# Patient Record
Sex: MALE | Race: WHITE | NOT HISPANIC OR LATINO | Employment: FULL TIME | URBAN - METROPOLITAN AREA
[De-identification: names, ages, dates, MRNs, and addresses within clinical notes are randomized per-mention and may not be internally consistent; named-entity substitution may affect disease eponyms.]

---

## 2018-09-14 ENCOUNTER — TRANSCRIBE ORDERS (OUTPATIENT)
Dept: ADMINISTRATIVE | Facility: HOSPITAL | Age: 48
End: 2018-09-14

## 2018-09-14 DIAGNOSIS — G47.30 SLEEP APNEA, UNSPECIFIED TYPE: Primary | ICD-10-CM

## 2018-09-27 ENCOUNTER — OFFICE VISIT (OUTPATIENT)
Dept: SLEEP CENTER | Facility: CLINIC | Age: 48
End: 2018-09-27
Payer: COMMERCIAL

## 2018-09-27 VITALS
DIASTOLIC BLOOD PRESSURE: 64 MMHG | HEIGHT: 69 IN | HEART RATE: 68 BPM | WEIGHT: 158.2 LBS | BODY MASS INDEX: 23.43 KG/M2 | SYSTOLIC BLOOD PRESSURE: 110 MMHG

## 2018-09-27 DIAGNOSIS — G47.30 SLEEP APNEA, UNSPECIFIED TYPE: ICD-10-CM

## 2018-09-27 PROCEDURE — 99204 OFFICE O/P NEW MOD 45 MIN: CPT | Performed by: NURSE PRACTITIONER

## 2018-09-27 RX ORDER — EPINEPHRINE 0.3 MG/.3ML
INJECTION SUBCUTANEOUS
Refills: 0 | COMMUNITY
Start: 2018-08-16

## 2018-09-27 RX ORDER — FLUTICASONE PROPIONATE 50 MCG
2 SPRAY, SUSPENSION (ML) NASAL
COMMUNITY
Start: 2017-03-07 | End: 2020-07-13

## 2018-09-27 NOTE — PROGRESS NOTES
Consultation - [facility] [facility] 1970, MRN: [id number]    9/27/2018        Reason for Consult / Principal Problem:    Evaluation for possible sleep apnea     Subjective:     HPI: Lalita Camarena is a 52y o  year old male  He presents for evaluation of possible sleep apnea  He complains of waking up feeling like he is choking and gasping for air  He has a feeling that he stops breathing while he is sleeping  He sleeps alone  He states that he uses a pulse oximeter at home during sleep and has oxygen desaturations to 85%  He feels sleepy during the day, mostly after eating lunch  He denies any significant health problems  He has been treated for babesiosis in the past and feels that he may have some form of Lyme disease, but testing has been negative thus far  He spends time outside biking and backpacking  Review of Systems      Genitourinary none   Cardiology palpitations/fluttering feeling in the chest   Gastrointestinal abdominal pain or cramping that disturb sleep    Neurology awaken with headache, numbness/tingling of an extremity, forgetfulness, poor concentration or confusion,  and difficulty with memory   Constitutional none   Integumentary none   Psychiatry anxiety and aggressiveness or irritability   Musculoskeletal joint pain, back pain, legs twitching/jerking and sciatica   Pulmonary chest tightness   ENT throat clearing and ringing in ears   Endocrine frequent urination   Hematological none     Employment:  He currently works full-time as an , Monday through Friday with a flexible schedule    Sleep Schedule:       Bedtime:  1:00 a m        Latency:  30 min      Wakeup time:  7:45 a m  on work days and 9:00 a m  on weekends    Awakenings:       Frequency:  Average of 1 time per night, but this varies and can be several times per night      Causes:  Unknown causes      Duration:  He typically falls asleep easily within a few minutes    Daytime Sleepiness / Inappropriate Sleep:       Most severe:  After lunch       Naps :  He does not intentionally take naps       Inappropriate drowsiness / sleep:  Occasionally may fall asleep with sedentary activities such as watching television or reading  This occurs particularly if he has not had a good night of sleep the prior night  Snoring:  No known snoring    Apnea:  No reported witnessed apnea    Change in Weight:  Weight has been relatively stable    Restless Leg Syndrome:  He does not experience clinical symptoms consistent with this diagnosis     Other Complaints:  As a child he was told that he talked in his sleep occasionally  He denies any sleep walking  He occasionally has nightmares, which can be threatening  He denies any acting out of dreams  He does notice some brief jerking in sleep at sleep initiation  He notices an overall body tremor that worsens as he is trying to sleep, especially when he sleeps with a pillow  Symptoms have worsened over the past 6 months  He is taking herbal supplements as recommended by his chiropractor  He gets numbness, tingling and inability to move an extremity during sleep, which returns when he is able to reposition himself, due to pressure on nerves in his neck  He denies sleep paralysis or hallucinations surrounding sleep  He admits to slight weakness in his knees with laughter on occasion, without extreme symptoms of knees buckling or needing to sit down  He denies any other symptoms characteristic of cataplexy  Social History:      Caffeine:  He avoids the use of caffeine       Tobacco:   reports that he has never smoked  He has never used smokeless tobacco        Alcohol:   reports that he drinks alcohol  Occasional drink with dinner     Drugs:   reports that he does not use drugs         The review of systems and following portions of the patient's history were reviewed and updated as appropriate: allergies, current medications, past family history, past medical history, past social history, past surgical history and problem list       Objective:       Vitals:    09/27/18 1000   BP: 110/64   Pulse: 68   Weight: 71 8 kg (158 lb 3 2 oz)   Height: 5' 9" (1 753 m)     Body mass index is 23 36 kg/m²  Neck Circumference: 35 (cm)  [place] Sleepiness Scale: Total score: 7      Current Outpatient Prescriptions:     Cats Claw, Uncaria tomentosa, (CATS CLAW PO), Take by mouth, Disp: , Rfl:     EPINEPHrine (EPIPEN) 0 3 mg/0 3 mL SOAJ, INJECT INTO THIGH AT START OF SEVERE ALLERGIC REACTION, Disp: , Rfl: 0    MAGNESIUM PO, Take by mouth, Disp: , Rfl:     NON FORMULARY, , Disp: , Rfl:     OLIVE LEAF EXTRACT PO, Take by mouth, Disp: , Rfl:     fluticasone (FLONASE) 50 mcg/act nasal spray, 2 sprays, Disp: , Rfl:     Physical Exam  General Appearance:   Alert, cooperative, no distress, appears stated age     Head:   Normocephalic, without obvious abnormality, atraumatic     Eyes:   PERRL, conjunctiva/corneas clear, EOM's intact          Nose:  Nares normal, septum midline, mucosa normal, no drainage or sinus tenderness           Throat:  Lips, teeth and gums normal; tongue normal size and  shape and midline in position; mucosa moist and essentially normal, uvula normal, tonsils normal, Mallampati class 2       Neck:  Supple, symmetrical, trachea midline, no adenopathy; Thyroid: No enlargement, tenderness or nodules; no carotid bruit or JVD     Lungs:      Clear to auscultation bilaterally, respirations unlabored     Heart:   Regular rate and rhythm, S1 and S2 normal, no murmur, rub or gallop       Extremities:  Extremities normal, atraumatic, no cyanosis or edema     Pulses:  2+ and symmetric all extremities     Skin:  Skin color, texture, turgor normal, no rashes or lesions       Neurologic:  CNII-XII intact  Normal strength, sensation throughout     Sleep Study Results:  No prior study      ASSESSMENT / PLAN     1   Sleep apnea, unspecified type  Ambulatory referral to Sleep Medicine    Diagnostic Sleep Study    CPAP Study         Counseling / Coordination of Care  Total clinic time spent today 50 minutes  Greater than 50% of total time was spent with the patient and / or family counseling and / or coordination of care  A description of the counseling / coordination of care:     instructions for management, risk factor reductions, prognosis, patient and family education, impressions and risks and benefits of treatment options    Today we discussed the anatomy and physiology of the upper airway  I pointed out how changes in this region can result in both snoring and abnormal breathing events including apneas and hypopneas  I explained the most common co-morbidities of untreated sleep apnea  After this we talked about some forms of treatment including application of positive airway pressure, mandibular advancement devices and surgery  In order to evaluate the possibility of Obstructive Sleep Apnea as a cause of the patient's symptoms, a test will be completed to identify the presence or absence of abnormal nocturnal breathing  This will consist of either a diagnostic polysomnogram or a home sleep test   If significant abnormal nocturnal breathing is detected, nasal CPAP will be titrated to find the optimum pressure needed to maintain upper airway patency during sleep  Following testing, the patient will return to the Sleep [address] [place] [address] for a review of the test results and to discuss possible treatment options  The following instructions have been given to the patient today:    Patient Instructions   1  Schedule diagnostic sleep study with CPAP titration study to follow, if needed  2  Schedule follow up visit to review study results  3  Schedule DME appointment for CPAP equipment, if needed  4   Schedule follow up visit for CPAP compliance and recheck            Irais Holman, [facility] [facility]

## 2018-10-15 ENCOUNTER — TELEPHONE (OUTPATIENT)
Dept: SLEEP CENTER | Facility: CLINIC | Age: 48
End: 2018-10-15

## 2018-10-15 DIAGNOSIS — G47.30 SLEEP APNEA, UNSPECIFIED TYPE: Primary | ICD-10-CM

## 2018-10-15 NOTE — TELEPHONE ENCOUNTER
Per fax from pt's insurance ( [employer])  Pt's in-lab Diagnostic Study has been denied, pt did not meet criteria

## 2018-10-16 ENCOUNTER — TELEPHONE (OUTPATIENT)
Dept: SLEEP CENTER | Facility: CLINIC | Age: 48
End: 2018-10-16

## 2018-10-16 ENCOUNTER — TRANSCRIBE ORDERS (OUTPATIENT)
Dept: SLEEP CENTER | Facility: CLINIC | Age: 48
End: 2018-10-16

## 2018-11-28 ENCOUNTER — HOSPITAL ENCOUNTER (OUTPATIENT)
Dept: SLEEP CENTER | Facility: CLINIC | Age: 48
Discharge: HOME | End: 2018-11-28
Payer: COMMERCIAL

## 2018-11-28 DIAGNOSIS — G47.30 SLEEP APNEA, UNSPECIFIED TYPE: ICD-10-CM

## 2018-11-28 PROCEDURE — G0399 HOME SLEEP TEST/TYPE 3 PORTA: HCPCS | Performed by: PSYCHIATRY & NEUROLOGY

## 2018-11-28 PROCEDURE — G0399 HOME SLEEP TEST/TYPE 3 PORTA: HCPCS

## 2018-12-06 ENCOUNTER — TELEPHONE (OUTPATIENT)
Dept: SLEEP CENTER | Facility: CLINIC | Age: 48
End: 2018-12-06

## 2018-12-06 NOTE — TELEPHONE ENCOUNTER
----- Message from Dustin Lopez DO sent at 12/2/2018  4:34 PM EST -----  CPAP already scheduled  Schedule follow up with MARIO Lazcano

## 2018-12-06 NOTE — TELEPHONE ENCOUNTER
Spoke with patient regarding mild obstructive sleep apnea on study and CPAP  He will keep appointment for CPAP study on 12/27 as scheduled  He has a follow up with Lolly Ruiz on 1/8

## 2018-12-27 ENCOUNTER — HOSPITAL ENCOUNTER (OUTPATIENT)
Dept: SLEEP CENTER | Facility: CLINIC | Age: 48
Discharge: HOME | End: 2018-12-27
Payer: COMMERCIAL

## 2018-12-27 DIAGNOSIS — G47.30 SLEEP APNEA, UNSPECIFIED TYPE: ICD-10-CM

## 2018-12-27 PROCEDURE — 95811 POLYSOM 6/>YRS CPAP 4/> PARM: CPT | Performed by: PSYCHIATRY & NEUROLOGY

## 2018-12-27 PROCEDURE — 95811 POLYSOM 6/>YRS CPAP 4/> PARM: CPT

## 2018-12-28 ENCOUNTER — TELEPHONE (OUTPATIENT)
Dept: SLEEP CENTER | Facility: CLINIC | Age: 48
End: 2018-12-28

## 2018-12-28 NOTE — PROGRESS NOTES
Sleep Study Documentation    Pre-Sleep Study       Sleep testing procedure explained to patient:YES    Patient napped prior to study:NO    Caffeine:Dayshift worker after 12PM   Caffeine use:NO    Alcohol:Dayshift workers after 5PM: Alcohol use:NO    Typical day for patient:YES       Study Documentation  Treatment   Optimal PAP pressure:  6 cm H2O  Leak:Small  Snore: unknown  REM Obtained:no  Supplemental O2: no    Minimum SaO2 88 %  Baseline SaO2 96 8 %  PAP mask tried (list all) Respironics Comfort Gel Blue Medium Nasal mask  Respironics Esquivel FX Medium Nasal Pillows and heated humidity  ResMed Airfit N20 Medium Nasal mask  PAP mask choice (final) PT prefers ResMed N20 Med Nasal mask  PAP mask type:nasal  PAP pressure at which snoring was eliminated  unknown  Minimum SaO2 at final PAP pressure 93 %  Mode of Therapy:CPAP  ETCO2:No  CPAP changed to BiPAP:No    Mode of Therapy:CPAP    EKG abnormalities: no     EEG abnormalities: no    Study Terminated:no           Post-Sleep Study    Medication used at bedtime or during sleep study:NO    Patient reports time it took to fall asleep:greater than 60 minutes    Patient reports waking up during study:1 to 2 times  Patient reports returning to sleep in greater than 30 minutes  Patient reports sleeping less than 2 hours without dreaming  Patient reports sleep during study:worse than usual    Patient rated sleepiness: Not sleepy or tired    PAP treatment:yes: Post PAP treatment patient reports feeling unsure if a change is noted and  would wear PAP mask at home

## 2018-12-28 NOTE — TELEPHONE ENCOUNTER
Advised patient that sleep study is resulted and he has appt with Bailey Barillas on 1/8/2019 to discuss  treatment options

## 2018-12-28 NOTE — TELEPHONE ENCOUNTER
----- Message from Antwon Tran DO sent at 12/28/2018  8:12 AM EST -----  This may be a great deal of difficulty tolerating nasal CPAP  If he is willing to try CPAP patient be set up for a fitting  The order for this will be written depending on his decision

## 2019-02-26 ENCOUNTER — OFFICE VISIT (OUTPATIENT)
Dept: SLEEP CENTER | Facility: CLINIC | Age: 49
End: 2019-02-26
Payer: COMMERCIAL

## 2019-02-26 VITALS
HEART RATE: 63 BPM | BODY MASS INDEX: 24.2 KG/M2 | HEIGHT: 69 IN | SYSTOLIC BLOOD PRESSURE: 106 MMHG | WEIGHT: 163.4 LBS | DIASTOLIC BLOOD PRESSURE: 64 MMHG

## 2019-02-26 DIAGNOSIS — G47.33 OSA (OBSTRUCTIVE SLEEP APNEA): Primary | ICD-10-CM

## 2019-02-26 PROCEDURE — 99214 OFFICE O/P EST MOD 30 MIN: CPT | Performed by: NURSE PRACTITIONER

## 2019-02-26 NOTE — PATIENT INSTRUCTIONS
1   Schedule appointment with ENT, as planned  2   Schedule appointment for APAP set up with [facility]  3   Schedule appointment for compliance visit in 2-3 months  4   Consider mandibular advancement device  5   Consider OTC Theravent device - trial pack, especially for traveling  6    Consider Breathe Right Nasal Strips, especially for traveling

## 2019-02-26 NOTE — PROGRESS NOTES
Progress Note - [address] [facility] [address] 50 y o  male   :1970, MRN: [id number]  2019          Follow Up Evaluation / Problem:     Mild Obstructive Sleep Apnea    HPI: Vince Rojas is a 50y o  year old male  He presents for follow up of mild obstructive sleep apnea  He complained of nocturnal choking and gasping with oxygen desaturation at home, as well as some daytime sleepiness  He completed a home sleep study and was found to have mild obstructive sleep apnea with an SHADI of 6 9  He completed a CPAP titration study and is here to review the results of both studies and options for treatment  Review of Systems      Genitourinary none   Cardiology palpitations/fluttering feeling in the chest   Gastrointestinal frequent heartburn/acid reflux and abdominal pain or cramping that disturb sleep    Neurology numbness/tingling of an extremity, forgetfulness, poor concentration or confusion,  and difficulty with memory   Constitutional none   Integumentary rash or dry skin   Psychiatry none   Musculoskeletal back pain and sciatica   Pulmonary frequent cough   ENT throat clearing and ringing in ears   Endocrine none   Hematological none       Current Outpatient Medications:     Cats Claw, Uncaria tomentosa, (CATS CLAW PO), Take by mouth, Disp: , Rfl:     EPINEPHrine (EPIPEN) 0 3 mg/0 3 mL SOAJ, INJECT INTO THIGH AT START OF SEVERE ALLERGIC REACTION, Disp: , Rfl: 0    fluticasone (FLONASE) 50 mcg/act nasal spray, 2 sprays, Disp: , Rfl:     MAGNESIUM PO, Take by mouth, Disp: , Rfl:     NON FORMULARY, , Disp: , Rfl:     OLIVE LEAF EXTRACT PO, Take by mouth, Disp: , Rfl:     [place] Sleepiness Scale  Sitting and reading: Slight chance of dozing  Watching TV: Slight chance of dozing  Sitting, inactive in a public place (e g  a theatre or a meeting):  Would never doze  As a passenger in a car for an hour without a break: Slight chance of dozing  Lying down to rest in the afternoon when circumstances permit: Moderate chance of dozing  Sitting and talking to someone: Would never doze  Sitting quietly after a lunch without alcohol: Slight chance of dozing  In a car, while stopped for a few minutes in traffic: Would never doze  Total score: 6              Vitals:    02/26/19 0900   BP: 106/64   Pulse: 63   Weight: 74 1 kg (163 lb 6 4 oz)   Height: 5' 9" (1 753 m)       Body mass index is 24 13 kg/m²  EPWORTH SLEEPINESS SCORE  Total score: 6      Past History Since Last Sleep Center Visit:   He has had some recent worsening of acid reflux symptoms  He has used OTC TUMS to treat his symptoms  He reports that he has had a GI evaluation in the past and symptoms subsided  He has purchased a new adjustable bed that will be delivered tomorrow  He reports that he had an extremely difficult time wearing the CPAP masks  He felt like they were pulled too tightly on his head and face  This caused him to have difficulty falling asleep and woke him up when he did sleep  He did not know that he could make adjustments to the masks  He tried 3 different masks during the study, but was unable to return to sleep  Results of the CPAP titration study completed on 12/28/18 are as follows:  Sleep latency was 1 hr 15 min  REM sleep was not seen  Sleep efficiency was very low at 9 3%  No abnormal  breathing events were identified minimum oxygen saturation was 88% no periodic limb movements were seen  A  total of 14 arousals were identified  The arousal index is 24 3 events per hour  After a long sleep latency a brief  episode of sleep was identified  This occurred from approximately 12:30 a m  to 1:15 a m    The remainder of the  evening was spent in wakefulness nasal CPAP was attempted beginning with 5 cm and gradually increased to 6 cm  of water  The patient had great difficulty getting comfortable using nasal CPA P   He was particularly bothered by  various types of interface devices used during the study  The titration was terminated at 6 cm of water due to these  problems  Further discussion of treatment options including nasal CPAP, surgery (UPPP), mandibular advancement  devices and weight loss should be considered  The review of systems and following portions of the patient's history were reviewed and updated as appropriate: allergies, current medications, past family history, past medical history, past social history, past surgical history, and problem list         OBJECTIVE    PAP Pressure: Nasal AutoPAP using a lower limit of 5 cm and an upper limit of 20 cm of water pressure  DME Provider: [employer]    Physical Exam:     General Appearance:   Alert, cooperative, no distress, appears stated age     Head:   Normocephalic, without obvious abnormality, atraumatic     Eyes:   PERRL, conjunctiva/corneas clear, EOM's intact          Nose:  Nares normal, septum midline, no drainage or sinus tenderness           Throat:  Lips, teeth and gums normal; tongue normal size and  shape and midline mucosa moist and normal in appearance, uvula normal, tonsils normal, Mallampati class 2       Neck:  Supple, symmetrical, trachea midline, no adenopathy; Thyroid: No enlargement, tenderness or nodules; no carotid bruit or JVD     Lungs:      Clear to auscultation bilaterally, respirations unlabored     Heart:   Regular rate and rhythm, S1 and S2 normal, no murmur, rub or gallop       Extremities:  Extremities normal, atraumatic, no cyanosis or edema     Pulses:  2+ and symmetric all extremities     Skin:  Skin color, texture, turgor normal, no rashes or lesions       Neurologic:  No focal deficits noted  ASSESSMENT / PLAN    1  RED (obstructive sleep apnea)  Cpap New DME           Counseling / Coordination of Care  Total clinic time spent today 30 minutes  Greater than 50% of total time was spent with the patient and / or family counseling and / or coordination of care       A description of the counseling / coordination of care:     Impressions, Diagnostic results, Prognosis, Instructions for management, Risks and benefits of treatment, Patient and family education, Risk factor reductions and Importance of compliance with treatment    I reviewed the recent test results with the patient  We talked about the abnormal findings, including those consistent with Obstructive Sleep Apnea  We then discussed how the these are categorized as mild, moderate or severe  We also covered the medical comorbidities associated with untreated Obstructive Sleep Apnea including, hypertension, cardiac arrythmia, myocardial infarction and stroke  I explained how the risk of these conditions increases with the severity of the test results  I also spoke in some detail about the most common treatment options including nasal PAP, mandibular advancement devices and uvulopalatopharyngoplasty (UPPP)  I answered all questions posed to me and gave my opinion regarding the best options for treatment based on the patient and their level of disease  In this case he would like to have the opportunity to try APAP  Order has been placed for the equipment  He will schedule an appointment for set up  He plans to schedule an appointment for evaluation with ENT regarding nasal and sinus issues that he has had for many years  He has noticed some improvement in symptoms with the use of breathe right nasal strips in the past   We also discussed the Theravent OTC product to help with mild symptoms  The use of a wedge pillow or his new adjustable bed may also improve his symptoms  He does not wish to consider a mandibular advancement device at this time  The patient will return in 8 to 12 weeks for a review of compliance data collected since beginning treatment  We will discuss this data and talk about any problems that may prevent successful treatment of Obstructive Sleep Apnea    Changes will be made in treatment parameters or interface devices in order to improve his ability to continue using PAP to maintain upper airway patency during sleep  The following instructions have been given to the patient today:    Patient Instructions   1  Schedule appointment with ENT, as planned  2   Schedule appointment for APAP set up with [facility]  3   Schedule appointment for compliance visit in 2-3 months  4   Consider mandibular advancement device  5   Consider OTC Theravent device - trial pack, especially for traveling  6    Consider Breathe Right Nasal Strips, especially for traveling        Jeannette Olivares, [address] [facility]

## 2019-03-13 ENCOUNTER — TELEPHONE (OUTPATIENT)
Dept: SLEEP CENTER | Facility: CLINIC | Age: 49
End: 2019-03-13

## 2019-04-02 ENCOUNTER — TRANSCRIBE ORDERS (OUTPATIENT)
Dept: LAB | Facility: CLINIC | Age: 49
End: 2019-04-02

## 2019-04-02 ENCOUNTER — APPOINTMENT (OUTPATIENT)
Dept: LAB | Facility: CLINIC | Age: 49
End: 2019-04-02
Payer: COMMERCIAL

## 2019-04-02 DIAGNOSIS — R53.83 FATIGUE, UNSPECIFIED TYPE: ICD-10-CM

## 2019-04-02 DIAGNOSIS — R53.83 FATIGUE, UNSPECIFIED TYPE: Primary | ICD-10-CM

## 2019-04-02 PROCEDURE — 84260 ASSAY OF SEROTONIN: CPT

## 2019-04-02 PROCEDURE — 36415 COLL VENOUS BLD VENIPUNCTURE: CPT

## 2019-04-02 PROCEDURE — 86038 ANTINUCLEAR ANTIBODIES: CPT

## 2019-04-03 LAB — RYE IGE QN: NEGATIVE

## 2019-04-05 LAB — SEROTONIN PLAS-MCNC: 130 NG/ML (ref 21–321)

## 2019-04-09 ENCOUNTER — APPOINTMENT (OUTPATIENT)
Dept: LAB | Facility: CLINIC | Age: 49
End: 2019-04-09
Payer: COMMERCIAL

## 2019-04-09 DIAGNOSIS — R53.83 OTHER FATIGUE: Primary | ICD-10-CM

## 2019-04-09 PROCEDURE — 82570 ASSAY OF URINE CREATININE: CPT

## 2019-04-09 PROCEDURE — 36415 COLL VENOUS BLD VENIPUNCTURE: CPT

## 2019-04-09 PROCEDURE — 80171 DRUG SCREEN QUANT GABAPENTIN: CPT

## 2019-04-09 PROCEDURE — 82384 ASSAY THREE CATECHOLAMINES: CPT

## 2019-04-09 PROCEDURE — 82128 AMINO ACIDS MULT QUAL: CPT

## 2019-04-10 ENCOUNTER — TRANSCRIBE ORDERS (OUTPATIENT)
Dept: LAB | Facility: HOSPITAL | Age: 49
End: 2019-04-10

## 2019-04-10 DIAGNOSIS — R53.83 OTHER FATIGUE: Primary | ICD-10-CM

## 2019-04-11 LAB — GABAPENTIN SERPLBLD-MCNC: NORMAL UG/ML (ref 4–16)

## 2019-04-13 LAB
CREAT UR-MCNC: 128.5 MG/DL
DOPAMINE UR-MCNC: 176 UG/L
DOPAMINE/CREAT UR: 137 UG/G CREAT (ref 0–348)
EPINEPH UR-MCNC: 5 UG/L
EPINEPH/CREAT UR: 4 UG/G CREAT (ref 0–19)
NOREPINEPH UR-MCNC: 21 UG/L
NOREPINEPH/CREAT UR: 16 UG/G CREAT (ref 0–111)

## 2019-04-15 ENCOUNTER — APPOINTMENT (OUTPATIENT)
Dept: LAB | Facility: CLINIC | Age: 49
End: 2019-04-15
Payer: COMMERCIAL

## 2019-04-15 DIAGNOSIS — R53.83 OTHER FATIGUE: ICD-10-CM

## 2019-04-15 LAB
(HCYS)2/CREAT UR-RTO: <0.3 UMOL/G CR (ref 0–2)
A-AMINOBUTYR/CREAT UR-RTO: 6.8 UMOL/G CR (ref 0–34.6)
AAA/CREAT UR-RTO: 26.9 UMOL/G CR (ref 0–146.7)
ALANINE/CREAT UR-RTO: 175.6 UMOL/G CR (ref 0–1337)
ALLOISOLEUCINE/CREAT UR-RTO: 0.3 UMOL/G CR (ref 0–13.5)
AMINO ACID PAT SERPL-IMP: NORMAL
AMINO ACID, QN URINE: NORMAL
ARGININE/CREAT UR-RTO: 13.9 UMOL/G CR (ref 0–69.6)
ARGININOSUCCINATE/CREAT UR-RTO: 10.1 UMOL/G CR (ref 0–51.2)
ASPARAGINE/CREAT UR-RTO: 66.9 UMOL/G CR (ref 0–454.2)
ASPARTATE/CREAT UR-RTO: 2.2 UMOL/G CR (ref 0–86.7)
B-AIB/CREAT UR-RTO: 231.7 UMOL/G CR (ref 0–807.9)
B-ALANINE/CREAT UR-RTO: 6.5 UMOL/G CR (ref 0–869.8)
CITRULLINE/CREAT UR-RTO: 2.3 UMOL/G CR (ref 0–27.4)
CYSTATHIONIN/CREAT UR-RTO: 9.3 UMOL/G CR (ref 0–80.8)
CYSTINE/CREAT UR-RTO: 26.8 UMOL/G CR (ref 0–223.8)
GABA/CREAT UR-RTO: 0.7 UMOL/G CR (ref 0–13.1)
GLUTAMATE/CREAT UR-RTO: 8.4 UMOL/G CR (ref 0–92.4)
GLUTAMINE/CREAT UR-RTO: 297.7 UMOL/G CR (ref 0–1756.2)
GLYCINE/CREAT UR-RTO: 625.5 UMOL/G CR (ref 0–7996.9)
HISTIDINE/CREAT UR-RTO: 362.5 UMOL/G CR (ref 0–2534.2)
HOMOCITRULLINE/CREAT UR-RTO: 9.2 UMOL/G CR (ref 0–80)
ISOLEUCINE/CREAT UR-RTO: 6.2 UMOL/G CR (ref 0–48.1)
LAB DIRECTOR NAME PROVIDER: NORMAL
LEUCINE/CREAT UR-RTO: 15.5 UMOL/G CR (ref 0–129.1)
LYSINE/CREAT UR-RTO: 48.8 UMOL/G CR (ref 0–1020.6)
METHIONINE/CREAT UR-RTO: 4.3 UMOL/G CR (ref 0–37.1)
OH-LYSINE/CREAT UR-RTO: 2.4 UMOL/G CR (ref 0–37.3)
OH-PROLINE/CREAT UR-RTO: 1.2 UMOL/G CR (ref 0–87.9)
ORNITHINE/CREAT UR-RTO: 6.7 UMOL/G CR (ref 0–76.3)
PHE/CREAT UR-RTO: 19 UMOL/G CR (ref 0–239)
PROLINE/CREAT UR-RTO: <5 UMOL/G CR (ref 0–168.6)
REF LAB TEST METHOD: NORMAL
SARCOSINE/CREAT UR-RTO: 1.1 UMOL/G CR (ref 0–27.3)
SERINE/CREAT UR-RTO: 209.4 UMOL/G CR (ref 0–1052.8)
TAURINE/CREAT UR-RTO: 459.8 UMOL/G CR (ref 0–5335.7)
THREONINE/CREAT UR-RTO: 90.2 UMOL/G CR (ref 0–714.9)
TRYPTOPHAN/CREAT UR-RTO: 27.2 UMOL/G CR (ref 0–207.5)
TYROSINE/CREAT UR-RTO: 32.5 UMOL/G CR (ref 0–388.9)
VALINE/CREAT UR-RTO: 24.2 UMOL/G CR (ref 0–147.4)

## 2019-04-15 PROCEDURE — 36415 COLL VENOUS BLD VENIPUNCTURE: CPT

## 2019-04-15 PROCEDURE — 83088 ASSAY OF HISTAMINE: CPT

## 2019-04-18 LAB — HISTAMINE BLD-MCNC: 46 NG/ML (ref 12–127)

## 2019-05-03 ENCOUNTER — APPOINTMENT (OUTPATIENT)
Dept: LAB | Facility: CLINIC | Age: 49
End: 2019-05-03
Payer: COMMERCIAL

## 2019-05-03 DIAGNOSIS — R53.83 OTHER FATIGUE: Primary | ICD-10-CM

## 2019-05-03 LAB — IRON SERPL-MCNC: 174 UG/DL (ref 65–175)

## 2019-05-03 PROCEDURE — 80171 DRUG SCREEN QUANT GABAPENTIN: CPT

## 2019-05-03 PROCEDURE — 80307 DRUG TEST PRSMV CHEM ANLYZR: CPT

## 2019-05-03 PROCEDURE — 83540 ASSAY OF IRON: CPT

## 2019-05-03 PROCEDURE — 36415 COLL VENOUS BLD VENIPUNCTURE: CPT

## 2019-05-06 LAB — GABAPENTIN SERPLBLD-MCNC: NORMAL UG/ML (ref 4–16)

## 2019-05-07 LAB — MISCELLANEOUS LAB TEST RESULT: NORMAL

## 2019-05-29 ENCOUNTER — TELEPHONE (OUTPATIENT)
Dept: SLEEP CENTER | Facility: CLINIC | Age: 49
End: 2019-05-29

## 2019-05-29 ENCOUNTER — OFFICE VISIT (OUTPATIENT)
Dept: SLEEP CENTER | Facility: CLINIC | Age: 49
End: 2019-05-29
Payer: COMMERCIAL

## 2019-05-29 VITALS
WEIGHT: 155 LBS | DIASTOLIC BLOOD PRESSURE: 66 MMHG | HEIGHT: 69 IN | HEART RATE: 70 BPM | SYSTOLIC BLOOD PRESSURE: 120 MMHG | BODY MASS INDEX: 22.96 KG/M2

## 2019-05-29 DIAGNOSIS — G47.31 COMPLEX SLEEP APNEA SYNDROME: Primary | ICD-10-CM

## 2019-05-29 PROCEDURE — 99214 OFFICE O/P EST MOD 30 MIN: CPT | Performed by: NURSE PRACTITIONER

## 2019-05-29 RX ORDER — ZOLPIDEM TARTRATE 10 MG/1
TABLET ORAL
Qty: 1 TABLET | Refills: 0 | Status: CANCELLED | OUTPATIENT
Start: 2019-05-29

## 2019-05-31 ENCOUNTER — TELEPHONE (OUTPATIENT)
Dept: SLEEP CENTER | Facility: CLINIC | Age: 49
End: 2019-05-31

## 2019-06-17 ENCOUNTER — TELEPHONE (OUTPATIENT)
Dept: PULMONOLOGY | Facility: HOSPITAL | Age: 49
End: 2019-06-17

## 2019-09-16 ENCOUNTER — OFFICE VISIT (OUTPATIENT)
Dept: PULMONOLOGY | Facility: HOSPITAL | Age: 49
End: 2019-09-16
Payer: COMMERCIAL

## 2019-09-16 VITALS
HEART RATE: 70 BPM | DIASTOLIC BLOOD PRESSURE: 70 MMHG | SYSTOLIC BLOOD PRESSURE: 110 MMHG | OXYGEN SATURATION: 96 % | BODY MASS INDEX: 23.33 KG/M2 | WEIGHT: 158 LBS | TEMPERATURE: 98.5 F

## 2019-09-16 DIAGNOSIS — R06.89 HYPOVENTILATION: ICD-10-CM

## 2019-09-16 DIAGNOSIS — G47.30 SLEEP APNEA, UNSPECIFIED TYPE: Primary | ICD-10-CM

## 2019-09-16 DIAGNOSIS — R06.00 DYSPNEA, UNSPECIFIED TYPE: ICD-10-CM

## 2019-09-16 PROCEDURE — 99204 OFFICE O/P NEW MOD 45 MIN: CPT | Performed by: INTERNAL MEDICINE

## 2019-09-16 RX ORDER — MELATONIN
50000 DAILY
COMMUNITY

## 2019-09-16 RX ORDER — POTASSIUM CHLORIDE 1.5 G/1.77G
500 POWDER, FOR SOLUTION ORAL 2 TIMES DAILY
COMMUNITY

## 2019-09-16 RX ORDER — LANOLIN ALCOHOL/MO/W.PET/CERES
CREAM (GRAM) TOPICAL DAILY
COMMUNITY

## 2019-09-16 NOTE — LETTER
September 16, 2019     AMRIO Solo  [address]    Patient: Juliocesar Nascimento   YOB: 1970   Date of Visit: 9/16/2019       Dear Dr Aracelis Agrawal Recipients: Thank you for referring Juliocesar Nascimento to me for evaluation  Below are my notes for this consultation  If you have questions, please do not hesitate to call me  I look forward to following your patient along with you  Sincerely,        Emili Burton DO        CC: No Recipients  Emili Butron DO  9/16/2019 10:42 PM  Sign at close encounter  Sleep Consultation   Juliocesar Nascimento 50 y o  male MRN: [id number]      Reason for consultation: management of RED/Complex apnea    Requesting physician: Miranda MARTIN    Assessment/Plan  50 y o  M with PMHx of C3-C6 foraminal stenosis who comes in for management of RED and nocturnal hypoxia  1   Mild RED (AHI - 6 9) - on AutoPAP 4-9  No clinical improvement and residual AHI - 9 5 which appears to be more obstructive but there are central events  (DME - brigette)       Mr Kindra Arevalo has failed CPAP  He has not been able to tolerate CPAP at higher pressures and he is have more events and hypoxia despite CPAP use  -  Will order a BiPAP study  This will help with tolerance and may also address #2  If he continues to have central events, will need to switch to ASV  -  Check ABG to assess for hypercapnia       -  If he has difficulty tolerating the above, can consider a positional device such as ZZOMA  However, I think in this case he would benefit from a repeat home study where he would try to stay off his back instead of purposely trying to sleep on his back  -  I also discussed in depth the risk of leaving sleep apnea untreated including hypertension, heart failure, arrhythmia, MI and stroke  2   Nocturnal hypoxia - this is noted as per pulse oximeter that he has that vibrates as he desaturates below 88%    He did not have this occur during his sleep study  If he truly does have nocturnal desaturation this could be from central apnea or alveolar hypoventilation (CO2 on BMP 27 - 31 in previous 3 years)  I do wonder if some of this is related to some diaphragmatic dysfunction related to cervical spine stenosis  However, he also did frequent scuba diving which could have also altered his pattern of respiratory as well  -  Check PFTs to assess for restrictive lung disease      -  Check ABG to assess for chronic hypercapnia      -  Consider CXR, SNF test to assess diaphragm function      -  BiPAP study as above      History of Present Illness   HPI:  Danna Guerrero is a 50 y o  male with PMHx as below who comes in for evaluation of obstructive sleep apnea and awakening with gasping  He was [facility] at [facility] where he was treated for mild RED with autoPAP  He had some difficulty with tolerance and recently had the pressure decreased  He states that he has never felt a clinical benefit with CPAP to this point  He has grown very frustrated with the machine and does not want to use it any longer  He has actually tried using an overnight pulse ox to see if he desaturates at night  It will actually wake him up if saturation drops below 88%  He has noticed that this occurs particularly when he is on his back  He will just start to drift asleep and then he will feel the vibration of the machine  He has even tried using the oximeter on BiPAP and it does not seem to reduce his hypoxia either  He has tried several masks to see which works best for him but has not noted significant improvement in his symptoms with any mask  He went to see his sleep physician and was found to have central events and was sent to be evaluated by me today to see if there are some adjustments that could occur  He feels very sleepy and frustrated with the machine    He states that he is willing to try anything to help with the apnea  Sleep History:  he goes to bed at approximately 12 - 1am, will get to sleep in 30 min, will get out of bed at 7:45 am   he will get up 1 times at night for unknown reason  It will then take few minutes to fall back asleep    he does not nap during he day  He also mentioned that during the day he will note a few periods where he can not take a deep breath  He feels like his body will actually cause him to pause his breathing  ROS:   Review of Systems   Constitutional: Positive for fatigue  Negative for appetite change and unexpected weight change  HENT: Negative  Eyes: Negative  Respiratory: Positive for apnea and choking  Negative for shortness of breath and wheezing  Cardiovascular: Negative  Gastrointestinal: Negative  Endocrine: Negative  Genitourinary: Negative  Musculoskeletal: Positive for neck pain  Negative for joint swelling  Neurological: Positive for numbness  Negative for dizziness  Hematological: Negative  Psychiatric/Behavioral: Positive for behavioral problems and sleep disturbance  Negative for dysphoric mood  The patient is not hyperactive  Historical Information   No past medical history on file    Past Surgical History:   Procedure Laterality Date    ARTHROSCOPY KNEE Left 1989    MOLE REMOVAL      TUMOR REMOVAL      benign turmor removed from forehead    WISDOM TOOTH EXTRACTION       Family History   Problem Relation Age of Onset    Hypertension Father     Stomach cancer Maternal Grandfather      Social History     Socioeconomic History    Marital status: Single     Spouse name: Not on file    Number of children: Not on file    Years of education: Not on file    Highest education level: Not on file   Occupational History    Not on file   Social Needs    Financial resource strain: Not on file    Food insecurity:     Worry: Not on file     Inability: Not on file    Transportation needs:     Medical: Not on file Non-medical: Not on file   Tobacco Use    Smoking status: Never Smoker    Smokeless tobacco: Never Used   Substance and Sexual Activity    Alcohol use: Yes     Comment: occasional drink with dinner    Drug use: No    Sexual activity: Not on file   Lifestyle    Physical activity:     Days per week: Not on file     Minutes per session: Not on file    Stress: Not on file   Relationships    Social connections:     Talks on phone: Not on file     Gets together: Not on file     Attends Shinto service: Not on file     Active member of club or organization: Not on file     Attends meetings of clubs or organizations: Not on file     Relationship status: Not on file    Intimate partner violence:     Fear of current or ex partner: Not on file     Emotionally abused: Not on file     Physically abused: Not on file     Forced sexual activity: Not on file   Other Topics Concern    Not on file   Social History Narrative    Not on file       Occupational History:   Frequently goes scuba diving    Meds/Allergies   Allergies   Allergen Reactions    Other      Cats, dust    Penicillins     Pollen Extract      pollen    Azithromycin Rash       Home medications:  Prior to Admission medications    Medication Sig Start Date End Date Taking?  Authorizing Provider   cholecalciferol (VITAMIN D3) 1,000 units tablet Take 50,000 Units by mouth daily   Yes Historical Provider, MD   EPINEPHrine (EPIPEN) 0 3 mg/0 3 mL SOAJ INJECT INTO THIGH AT START OF SEVERE ALLERGIC REACTION 8/16/18  Yes Historical Provider, MD   MAGNESIUM PO Take by mouth   Yes Historical Provider, MD   NON FORMULARY    Yes Historical Provider, MD   OLIVE LEAF EXTRACT PO Take by mouth   Yes Historical Provider, MD   potassium chloride (KLOR-CON) 20 mEq packet Take 500 mEq by mouth 2 (two) times a day   Yes Historical Provider, MD   vitamin B-12 (VITAMIN B-12) 1,000 mcg tablet Take by mouth daily   Yes Historical Provider, MD Suzie Dove tomentosa, (CATS CLAW PO) Take by mouth    Historical Provider, MD   fluticasone (FLONASE) 50 mcg/act nasal spray 2 sprays 3/7/17   Historical Provider, MD       Vitals:   Blood pressure 110/70, pulse 70, temperature 98 5 °F (36 9 °C), temperature source Tympanic, weight 71 7 kg (158 lb), SpO2 96 % , RA, Body mass index is 23 33 kg/m²  Physical Exam  General: Pleasant, thin, Awake alert and oriented x 3, conversant without conversational dyspnea, NAD, normal affect  HEENT:  PERRL, Sclera noninjected, nonicteric OU, Nares patent,  no craniofacial abnormalities, Mucous membranes, moist, no oral lesions, normal dentition, Mallampati class 2  NECK: Trachea midline, no accessory muscle use, no stridor, no cervical or supraclavicular adenopathy, JVP not elevated  CARDIAC: Reg, single s1/S2, no m/r/g  PULM: CTA bilaterally no wheezing, rhonchi or rales  ABD: Normoactive bowel sounds, soft nontender, nondistended, no rebound, no rigidity, no guarding  EXT: No cyanosis, no clubbing, no edema, normal capillary refill  NEURO: no focal neurologic deficits, AAOx3, moving all extremities appropriately    Labs: I have personally reviewed pertinent lab results  Lab Results   Component Value Date    IRON 174 05/03/2019       Sleep studies:  Home study:  he apnea-hypopnea index was 6 9 events per hour  Lowest measured oxygen saturation was 80%  No other significant abnormalities were identified  These findings are  consistent with a diagnosis of mild obstructive sleep apnea  A repeat tracing for titration of nasal CPAP is indicated  Titration:  Sleep latency was 1 hr 15 min  REM sleep was not seen  Sleep efficiency was very low at 9 3%  No abnormal  breathing events were identified minimum oxygen saturation was 88% no periodic limb movements were seen  A  total of 14 arousals were identified       The remainder of the  evening was spent in wakefulness nasal CPAP was attempted beginning with 5 cm and gradually increased to 6 cm of water  The patient had great difficulty getting comfortable using nasal CPA P  He was particularly bothered by various types of interface devices used during the study  The titration was terminated at 6 cm of water  Compliance Data:   he has been able to use the equipment 73 3% of all days recorded  Average usage was 4 or more hours 66 7% of all days recorded  The estimated AHI is 7 1 abnormal breathing events per hour  In looking at data from the [facility], types of apneas are mixed between clear, hypopneas and obstructive, including almost equal amounts of hypopneas and clear apneas, with less obstructive apnea        Compliance Data:  8/17/19 - 9/15/19                                  Type of CPAP:  autoPAP 4-9, mean pressure 6 7-8 3                                   Percent usage: 87%                                   Average time used: 4 hr 4 mins                                  Time in large leak: 11 mins 21 sec                                   Residual AHI: 9 5,  Average ALOK - 3 1  OAI - 2 3                                       DO [facility]

## 2019-09-17 NOTE — PROGRESS NOTES
Sleep Consultation   John Lucero 50 y o  male MRN: [id number]      Reason for consultation: management of RED/Complex apnea    Requesting physician: Ruddy MARTIN    Assessment/Plan  50 y o  M with PMHx of C3-C6 foraminal stenosis who comes in for management of RED and nocturnal hypoxia  1   Mild RED (AHI - 6 9) - on AutoPAP 4-9  No clinical improvement and residual AHI - 9 5 which appears to be more obstructive but there are central events  (DME - brigette)       Mr Song Pinedo has failed CPAP  He has not been able to tolerate CPAP at higher pressures and he is have more events and hypoxia despite CPAP use  -  Will order a BiPAP study  This will help with tolerance and may also address #2  If he continues to have central events, will need to switch to ASV  -  Check ABG to assess for hypercapnia       -  If he has difficulty tolerating the above, can consider a positional device such as ZZOMA  However, I think in this case he would benefit from a repeat home study where he would try to stay off his back instead of purposely trying to sleep on his back  -  I also discussed in depth the risk of leaving sleep apnea untreated including hypertension, heart failure, arrhythmia, MI and stroke  2   Nocturnal hypoxia - this is noted as per pulse oximeter that he has that vibrates as he desaturates below 88%  He did not have this occur during his sleep study  If he truly does have nocturnal desaturation this could be from central apnea or alveolar hypoventilation (CO2 on BMP 27 - 31 in previous 3 years)  I do wonder if some of this is related to some diaphragmatic dysfunction related to cervical spine stenosis  However, he also did frequent scuba diving which could have also altered his pattern of respiratory as well        -  Check PFTs to assess for restrictive lung disease      -  Check ABG to assess for chronic hypercapnia      -  Consider CXR, SNF test to assess diaphragm function      -  BiPAP study as above      History of Present Illness   HPI:  Taty Newby is a 50 y o  male with PMHx as below who comes in for evaluation of obstructive sleep apnea and awakening with gasping  He was seen at the [facility] where he was treated for mild RED with autoPAP  He had some difficulty with tolerance and recently had the pressure decreased  He states that he has never felt a clinical benefit with CPAP to this point  He has grown very frustrated with the machine and does not want to use it any longer  He has actually tried using an overnight pulse ox to see if he desaturates at night  It will actually wake him up if saturation drops below 88%  He has noticed that this occurs particularly when he is on his back  He will just start to drift asleep and then he will feel the vibration of the machine  He has even tried using the oximeter on BiPAP and it does not seem to reduce his hypoxia either  He has tried several masks to see which works best for him but has not noted significant improvement in his symptoms with any mask  He went to see his sleep physician and was found to have central events and was sent to be evaluated by me today to see if there are some adjustments that could occur  He feels very sleepy and frustrated with the machine  He states that he is willing to try anything to help with the apnea  Sleep History:  he goes to bed at approximately 12 - 1am, will get to sleep in 30 min, will get out of bed at 7:45 am   he will get up 1 times at night for unknown reason  It will then take few minutes to fall back asleep    he does not nap during he day  He also mentioned that during the day he will note a few periods where he can not take a deep breath  He feels like his body will actually cause him to pause his breathing  ROS:   Review of Systems   Constitutional: Positive for fatigue   Negative for appetite change and unexpected weight change  HENT: Negative  Eyes: Negative  Respiratory: Positive for apnea and choking  Negative for shortness of breath and wheezing  Cardiovascular: Negative  Gastrointestinal: Negative  Endocrine: Negative  Genitourinary: Negative  Musculoskeletal: Positive for neck pain  Negative for joint swelling  Neurological: Positive for numbness  Negative for dizziness  Hematological: Negative  Psychiatric/Behavioral: Positive for behavioral problems and sleep disturbance  Negative for dysphoric mood  The patient is not hyperactive  Historical Information   No past medical history on file    Past Surgical History:   Procedure Laterality Date    ARTHROSCOPY KNEE Left 1989    MOLE REMOVAL      TUMOR REMOVAL      benign turmor removed from forehead    WISDOM TOOTH EXTRACTION       Family History   Problem Relation Age of Onset    Hypertension Father     Stomach cancer Maternal Grandfather      Social History     Socioeconomic History    Marital status: Single     Spouse name: Not on file    Number of children: Not on file    Years of education: Not on file    Highest education level: Not on file   Occupational History    Not on file   Social Needs    Financial resource strain: Not on file    Food insecurity:     Worry: Not on file     Inability: Not on file    Transportation needs:     Medical: Not on file     Non-medical: Not on file   Tobacco Use    Smoking status: Never Smoker    Smokeless tobacco: Never Used   Substance and Sexual Activity    Alcohol use: Yes     Comment: occasional drink with dinner    Drug use: No    Sexual activity: Not on file   Lifestyle    Physical activity:     Days per week: Not on file     Minutes per session: Not on file    Stress: Not on file   Relationships    Social connections:     Talks on phone: Not on file     Gets together: Not on file     Attends Rastafarian service: Not on file     Active member of club or organization: Not on file     Attends meetings of clubs or organizations: Not on file     Relationship status: Not on file    Intimate partner violence:     Fear of current or ex partner: Not on file     Emotionally abused: Not on file     Physically abused: Not on file     Forced sexual activity: Not on file   Other Topics Concern    Not on file   Social History Narrative    Not on file       Occupational History:   Frequently goes scuba diving    Meds/Allergies   Allergies   Allergen Reactions    Other      Cats, dust    Penicillins     Pollen Extract      pollen    Azithromycin Rash       Home medications:  Prior to Admission medications    Medication Sig Start Date End Date Taking? Authorizing Provider   cholecalciferol (VITAMIN D3) 1,000 units tablet Take 50,000 Units by mouth daily   Yes Historical Provider, MD   EPINEPHrine (EPIPEN) 0 3 mg/0 3 mL SOAJ INJECT INTO THIGH AT START OF SEVERE ALLERGIC REACTION 8/16/18  Yes Historical Provider, MD   MAGNESIUM PO Take by mouth   Yes Historical Provider, MD   NON FORMULARY    Yes Historical Provider, MD   OLIVE LEAF EXTRACT PO Take by mouth   Yes Historical Provider, MD   potassium chloride (KLOR-CON) 20 mEq packet Take 500 mEq by mouth 2 (two) times a day   Yes Historical Provider, MD   vitamin B-12 (VITAMIN B-12) 1,000 mcg tablet Take by mouth daily   Yes Historical Provider, MD   Cats Claw, Uncaria tomentosa, (CATS CLAW PO) Take by mouth    Historical Provider, MD   fluticasone (FLONASE) 50 mcg/act nasal spray 2 sprays 3/7/17   Historical Provider, MD       Vitals:   Blood pressure 110/70, pulse 70, temperature 98 5 °F (36 9 °C), temperature source Tympanic, weight 71 7 kg (158 lb), SpO2 96 % , RA, Body mass index is 23 33 kg/m²         Physical Exam  General: Pleasant, thin, Awake alert and oriented x 3, conversant without conversational dyspnea, NAD, normal affect  HEENT:  PERRL, Sclera noninjected, nonicteric OU, Nares patent,  no craniofacial abnormalities, Mucous membranes, moist, no oral lesions, normal dentition, Mallampati class 2  NECK: Trachea midline, no accessory muscle use, no stridor, no cervical or supraclavicular adenopathy, JVP not elevated  CARDIAC: Reg, single s1/S2, no m/r/g  PULM: CTA bilaterally no wheezing, rhonchi or rales  ABD: Normoactive bowel sounds, soft nontender, nondistended, no rebound, no rigidity, no guarding  EXT: No cyanosis, no clubbing, no edema, normal capillary refill  NEURO: no focal neurologic deficits, AAOx3, moving all extremities appropriately    Labs: I have personally reviewed pertinent lab results  Lab Results   Component Value Date    IRON 174 05/03/2019       Sleep studies:  Home study:  he apnea-hypopnea index was 6 9 events per hour  Lowest measured oxygen saturation was 80%  No other significant abnormalities were identified  These findings are  consistent with a diagnosis of mild obstructive sleep apnea  A repeat tracing for titration of nasal CPAP is indicated  Titration:  Sleep latency was 1 hr 15 min  REM sleep was not seen  Sleep efficiency was very low at 9 3%  No abnormal  breathing events were identified minimum oxygen saturation was 88% no periodic limb movements were seen  A  total of 14 arousals were identified  The remainder of the  evening was spent in wakefulness nasal CPAP was attempted beginning with 5 cm and gradually increased to 6 cm of water  The patient had great difficulty getting comfortable using nasal CPA P  He was particularly bothered by various types of interface devices used during the study  The titration was terminated at 6 cm of water  Compliance Data:   he has been able to use the equipment 73 3% of all days recorded  Average usage was 4 or more hours 66 7% of all days recorded  The estimated AHI is 7 1 abnormal breathing events per hour    In looking at data from the [facility], types of apneas are mixed between clear, hypopneas and obstructive, including almost equal amounts of hypopneas and clear apneas, with less obstructive apnea        Compliance Data:  8/17/19 - 9/15/19                                  Type of CPAP:  autoPAP 4-9, mean pressure 6 7-8 3                                   Percent usage: 87%                                   Average time used: 4 hr 4 mins                                  Time in large leak: 11 mins 21 sec                                   Residual AHI: 9 5,  Average ALOK - 3 1  OAI - 2 3                                       René Menjivar, DO  [facility]

## 2019-09-23 ENCOUNTER — HOSPITAL ENCOUNTER (OUTPATIENT)
Dept: PULMONOLOGY | Facility: HOSPITAL | Age: 49
Discharge: HOME | End: 2019-09-23
Attending: INTERNAL MEDICINE
Payer: COMMERCIAL

## 2019-09-23 DIAGNOSIS — R06.00 DYSPNEA, UNSPECIFIED TYPE: ICD-10-CM

## 2019-09-23 DIAGNOSIS — R06.89 HYPOVENTILATION: ICD-10-CM

## 2019-09-23 LAB
ARTERIAL PATENCY WRIST A: YES
BASE EXCESS BLDA CALC-SCNC: 1.4 MMOL/L
HCO3 BLDA-SCNC: 23.5 MMOL/L (ref 22–28)
NON VENT ROOM AIR: 21 %
O2 CT BLDA-SCNC: 21 ML/DL (ref 16–23)
OXYHGB MFR BLDA: 97.7 % (ref 94–97)
PCO2 BLDA: 30.5 MM HG (ref 36–44)
PH BLDA: 7.5 [PH] (ref 7.35–7.45)
PO2 BLDA: 98.5 MM HG (ref 75–129)
SPECIMEN SOURCE: ABNORMAL

## 2019-09-23 PROCEDURE — 94010 BREATHING CAPACITY TEST: CPT

## 2019-09-23 PROCEDURE — 94726 PLETHYSMOGRAPHY LUNG VOLUMES: CPT | Performed by: INTERNAL MEDICINE

## 2019-09-23 PROCEDURE — 94726 PLETHYSMOGRAPHY LUNG VOLUMES: CPT

## 2019-09-23 PROCEDURE — 94729 DIFFUSING CAPACITY: CPT

## 2019-09-23 PROCEDURE — 82805 BLOOD GASES W/O2 SATURATION: CPT | Performed by: INTERNAL MEDICINE

## 2019-09-23 PROCEDURE — 94729 DIFFUSING CAPACITY: CPT | Performed by: INTERNAL MEDICINE

## 2019-09-23 PROCEDURE — 36600 WITHDRAWAL OF ARTERIAL BLOOD: CPT

## 2019-09-23 PROCEDURE — 94010 BREATHING CAPACITY TEST: CPT | Performed by: INTERNAL MEDICINE

## 2019-09-23 PROCEDURE — 94760 N-INVAS EAR/PLS OXIMETRY 1: CPT

## 2019-09-23 RX ORDER — ALBUTEROL SULFATE 2.5 MG/3ML
SOLUTION RESPIRATORY (INHALATION)
Status: DISCONTINUED
Start: 2019-09-23 | End: 2019-09-23 | Stop reason: WASHOUT

## 2019-09-23 RX ORDER — ALBUTEROL SULFATE 2.5 MG/3ML
2.5 SOLUTION RESPIRATORY (INHALATION) ONCE AS NEEDED
Status: DISCONTINUED | OUTPATIENT
Start: 2019-09-23 | End: 2019-09-27 | Stop reason: HOSPADM

## 2019-10-06 ENCOUNTER — HOSPITAL ENCOUNTER (OUTPATIENT)
Dept: SLEEP CENTER | Facility: HOSPITAL | Age: 49
Discharge: HOME | End: 2019-10-06
Attending: INTERNAL MEDICINE
Payer: COMMERCIAL

## 2019-10-06 DIAGNOSIS — G47.30 SLEEP APNEA, UNSPECIFIED TYPE: ICD-10-CM

## 2019-10-06 PROCEDURE — 95811 POLYSOM 6/>YRS CPAP 4/> PARM: CPT | Performed by: INTERNAL MEDICINE

## 2019-10-06 PROCEDURE — 95811 POLYSOM 6/>YRS CPAP 4/> PARM: CPT

## 2019-10-07 NOTE — PROGRESS NOTES
Sleep Study Documentation    Pre-Sleep Study       Sleep testing procedure explained to patient:YES    Patient napped prior to study:NO    Caffeine:Dayshift worker after 12PM   Caffeine use:NO    Alcohol:Dayshift workers after 5PM: Alcohol use:NO    Typical day for patient:YES       Study Documentation    Sleep Study Indications: G47 10    Sleep Study: Treatment   Optimal PAP pressure: 16/12  Leak:Large  Snore:Eliminated  REM Obtained:yes  Supplemental O2: no    Minimum SaO2 79  Baseline SaO2 100  PAP mask tried (list all) Resmed Airfit F10 and N20  PAP mask choice (final) Resmed Airfit F10  PAP mask type:full face  PAP pressure at which snoring was eliminated 8/4  Minimum SaO2 at final PAP pressure   Mode of Therapy:BiPAP  ETCO2:No  CPAP changed to BiPAP:No    Mode of Therapy:    EKG abnormalities: no     EEG abnormalities: no    Sleep Study Recorded < 2 hours: N/A    Sleep Study Recorded > 2 hours but incomplete study: N/A    Sleep Study Recorded 6 hours but no sleep obtained: NO    Patient classification: employed       Post-Sleep Study    Medication used at bedtime or during sleep study:YES over the counter sleep aid    Patient reports time it took to fall asleep:30 to 60 minutes    Patient reports waking up during study:3 or more times  Patient reports returning to sleep in 10 to 30 minutes  Patient reports sleeping 4 to 6 hours without dreaming  Patient reports sleep during study:worse than usual    Patient rated sleepiness: Somewhat sleepy or tired    PAP treatment:yes: Post PAP treatment patient reports feeling better and  would wear PAP mask at home

## 2019-10-14 DIAGNOSIS — G47.30 SLEEP APNEA, UNSPECIFIED TYPE: Primary | ICD-10-CM

## 2019-10-17 ENCOUNTER — TELEPHONE (OUTPATIENT)
Dept: PULMONOLOGY | Facility: CLINIC | Age: 49
End: 2019-10-17

## 2019-10-17 NOTE — TELEPHONE ENCOUNTER
I called patient and went over the results of his BiPAP study  Patient is willing to proceed with auto BiPAP  Script for BiPAP and supplies along with all needed documents has been faxed to [facility] at [phone]  Patient will call me in 2 weeks if he does not hear from [facility]  Mr Colleen Vang is scheduled for a follow up with Dr Brittney Mirza on 12/16/19 and compliance will be addressed at that time

## 2019-11-07 ENCOUNTER — TELEPHONE (OUTPATIENT)
Dept: PULMONOLOGY | Facility: CLINIC | Age: 49
End: 2019-11-07

## 2019-11-07 NOTE — TELEPHONE ENCOUNTER
I called patient back to get more information in regards to the issues he is having with his BiPAP  Patient feels that the pressures are to high  When he puts the mask on his face at night be feels like it gets blown off due to the high pressures  He also stated that he is taking in to much air which causes him to become bloated  Patient would like his pressures lowered  I have sent a message to the doctor  Once pressure change script is placed I will fax to [address]  and inform patient

## 2019-11-07 NOTE — TELEPHONE ENCOUNTER
Patient called stating since the switch to bipap the pressure it  to much for him  Patient would like to have his pressures changes   Please advise

## 2019-11-13 DIAGNOSIS — G47.31 COMPLEX SLEEP APNEA SYNDROME: Primary | ICD-10-CM

## 2019-11-14 NOTE — TELEPHONE ENCOUNTER
Pressure change script faxed to [facility] at [phone] on 11/14/19  Pt has been notified

## 2019-12-16 ENCOUNTER — TRANSCRIBE ORDERS (OUTPATIENT)
Dept: LAB | Facility: CLINIC | Age: 49
End: 2019-12-16

## 2019-12-16 ENCOUNTER — OFFICE VISIT (OUTPATIENT)
Dept: PULMONOLOGY | Facility: CLINIC | Age: 49
End: 2019-12-16
Payer: COMMERCIAL

## 2019-12-16 VITALS
HEIGHT: 69 IN | WEIGHT: 154 LBS | SYSTOLIC BLOOD PRESSURE: 118 MMHG | HEART RATE: 56 BPM | RESPIRATION RATE: 16 BRPM | BODY MASS INDEX: 22.81 KG/M2 | TEMPERATURE: 97 F | OXYGEN SATURATION: 99 % | DIASTOLIC BLOOD PRESSURE: 70 MMHG

## 2019-12-16 DIAGNOSIS — G47.34 NOCTURNAL HYPOXIA: ICD-10-CM

## 2019-12-16 DIAGNOSIS — G47.30 SLEEP APNEA, UNSPECIFIED TYPE: ICD-10-CM

## 2019-12-16 DIAGNOSIS — M25.519 SHOULDER PAIN, UNSPECIFIED CHRONICITY, UNSPECIFIED LATERALITY: ICD-10-CM

## 2019-12-16 DIAGNOSIS — E87.3 RESPIRATORY ALKALOSIS: ICD-10-CM

## 2019-12-16 DIAGNOSIS — G47.31 COMPLEX SLEEP APNEA SYNDROME: Primary | ICD-10-CM

## 2019-12-16 PROCEDURE — 99214 OFFICE O/P EST MOD 30 MIN: CPT | Performed by: INTERNAL MEDICINE

## 2019-12-16 NOTE — LETTER
December 17, 2019     Franklin Whaley DO  [address]  [address] [facility] [address]    Patient: Darien Yeager   YOB: 1970   Date of Visit: 12/16/2019       Dear Dr Kisha Brown: Thank you for referring Darien Yeager to me for evaluation  Below are my notes for this consultation  If you have questions, please do not hesitate to call me  I look forward to following your patient along with you  Sincerely,        Johnathon Givens DO        CC: No Recipients  Johnathon Givens DO  12/17/2019  4:33 PM  Sign at close encounter  Progress Note - Sleep Medicine  Darien Yeager 52 y o  male MRN: [id number]       Impression & Plan:   52 y o  M with PMHx of C3-C6 foraminal stenosis who comes in for management of RED and nocturnal hypoxia  1   Mild RED (AHI - 6 9) - on Auto BiPAP 4-9  He continues to have some central events but they have been reduced  They seem to occur early in the night   (DME - [facility])  ABG with respiratory alkalosis  -  Continue autotitrating BiPAP  -  He prefers to avoid a positional device as he feels anxious about something being wrapped around him at bed  He also prefers to avoid an oral appliance as well  -  I also discussed in depth the risk of leaving sleep apnea untreated including hypertension, heart failure, arrhythmia, MI and stroke      2  Nocturnal hypoxia -he continues to note this  He brought in data form his pulse oximeter which demonstrate in first 1/2 hour of the night, regardless of BIPAP or no BiPAP/   It resolves relatively quickly and he does well the remaining portion of the night  Given nocturnal pulse oximetry data, it does seem to appear early in the night and resolve  I do wonder if it is in part artifact vs  Central events early in the night     This could be related to a respiratory alkalosis he has from anxiety which upon initiation of PAP may worsen prior to when he falls asleep and his PCO2 finally rises  PFTs are normal, ABG with respiratory alkalosis but on hypoxia          -  Continue autoBIPAP at current pressure  -   I would try melatonin before bed, this may help with sleep onset  -  If this is not helpful, I would consider temazepam before bet to help slow his respiratory rate  3   Shoulder pain - he has some neuropathic discomfort  Alternatively an attempt with neurontin may also be effective in calming him and preventing this from occurring    4  Respiratory alkalosis - this could be in part related to anxiety vs  Previous scuba diving which may have altered the ventilatory response to oxygen  -  Would consider Restoril or Klonopin before bed as this may slow his breathing and prevent this issue from occurring       ______________________________________________________________________    HPI:    Darien Yeager presents today for follow-up for difficulty with his BIPAP  He states that he feels autoBiPAP is a bit better but he is still not feeling well on the machine  He is willing to continue use of BiPAP though he feels frustrated with the machine and is thinking about giving up on it  He understands that his apnea is mild and is wondering if he truly needs it  He did get some objective data with pulse ox machine he has  It shows an abrupt reduction in his oxygen in the first 30 minutes of sleep  It then improves the remaining portion of the night  He states he does not feel any better with the use of the machine  He does not feel refreshed at all  Review of Systems:  Review of Systems   HENT: Negative  Eyes: Negative  Respiratory: Negative  Cardiovascular: Negative  Gastrointestinal: Negative  Endocrine: Negative  Genitourinary: Negative  Musculoskeletal: Negative  Neurological: Negative  Psychiatric/Behavioral: Negative            Social history updates:  Social History     Tobacco Use   Smoking Status Never Smoker   Smokeless Tobacco Never Used     Social History     Socioeconomic History    Marital status: Single     Spouse name: Not on file    Number of children: Not on file    Years of education: Not on file    Highest education level: Not on file   Occupational History    Not on file   Social Needs    Financial resource strain: Not on file    Food insecurity:     Worry: Not on file     Inability: Not on file    Transportation needs:     Medical: Not on file     Non-medical: Not on file   Tobacco Use    Smoking status: Never Smoker    Smokeless tobacco: Never Used   Substance and Sexual Activity    Alcohol use: Yes     Comment: occasional drink with dinner    Drug use: No    Sexual activity: Not on file   Lifestyle    Physical activity:     Days per week: Not on file     Minutes per session: Not on file    Stress: Not on file   Relationships    Social connections:     Talks on phone: Not on file     Gets together: Not on file     Attends Gnosticism service: Not on file     Active member of club or organization: Not on file     Attends meetings of clubs or organizations: Not on file     Relationship status: Not on file    Intimate partner violence:     Fear of current or ex partner: Not on file     Emotionally abused: Not on file     Physically abused: Not on file     Forced sexual activity: Not on file   Other Topics Concern    Not on file   Social History Narrative    Not on file       PhysicalExamination:  Vitals:   /70   Pulse 56   Temp (!) 97 °F (36 1 °C)   Resp 16   Ht 5' 9" (1 753 m)   Wt 69 9 kg (154 lb)   SpO2 99%   BMI 22 74 kg/m²    General: Anxious, Awake alert and oriented x 3, conversant without conversational dyspnea, NAD, normal affect  HEENT:  PERRL, Sclera noninjected, nonicteric OU, Nares patent,  no craniofacial abnormalities, Mucous membranes, moist, no oral lesions, normal dentition  NECK: Trachea midline, no accessory muscle use, no stridor, no cervical or supraclavicular adenopathy, JVP not elevated  CARDIAC: Reg, single s1/S2, no m/r/g  PULM: CTA bilaterally no wheezing, rhonchi or rales  ABD: Normoactive bowel sounds, soft nontender, nondistended, no rebound, no rigidity, no guarding  EXT: No cyanosis, no clubbing, no edema, normal capillary refill  NEURO: no focal neurologic deficits, AAOx3, moving all extremities appropriately      Diagnostic Data:  PFT and ABG:  My interpretation  Normal spirometry, lung volumes and DLCO    ABG with respiratory alkalosis, no hypoxia noted  Results:  FEV1/FVC Ratio: 72 %  Forced Vital Capacity: 5 22 L    104 % predicted  FEV1: 3 79 L     95 % predicted     Lung volumes by body plethysmography:   Total Lung Capacity 105 % predicted   Residual volume 104 % predicted     DLCO corrected for patients hemoglobin level: 102 %     AB 50/30/98 saturation 97% on 21% FiO2    Sleep studies:  Home study:  he apnea-hypopnea index was 6 9 events per hour  Lowest measured oxygen saturation was 80%  No other significant abnormalities were identified  These findings are  consistent with a diagnosis of mild obstructive sleep apnea  A repeat tracing for titration of nasal CPAP is indicated      Titration:  Sleep latency was 1 hr 15 min  REM sleep was not seen  Sleep efficiency was very low at 9 3%  No abnormal  breathing events were identified minimum oxygen saturation was 88% no periodic limb movements were seen  A  total of 14 arousals were identified  The remainder of the  evening was spent in wakefulness nasal CPAP was attempted beginning with 5 cm and gradually increased to 6 cm of water  The patient had great difficulty getting comfortable using nasal CPA P  He was particularly bothered by various types of interface devices used during the study   The titration was terminated at 6 cm of water      Compliance Data:   he has been able to use the equipment 73 3% of all days recorded   Average usage was 4 or more hours 66 7% of all days recorded  Gigi Cho estimated AHI is 7 1 abnormal breathing events per hour   In looking at data from the [facility], types of apneas are mixed between clear, hypopneas and obstructive, including almost equal amounts of hypopneas and clear apneas, with less obstructive apnea        Compliance Data:  8/17/19 - 9/15/19                                  Type of CPAP:  AutoCPAP 6-9                                   Percent usage: 87%                                   Average time used: 4 hr 4 mins                                  Time in large leak: 11 mins 21 sec                                   Residual AHI: 9 5,  Average ALOK - 3 1  OAI - 2 3    Compliance Data:   11/18/19 - 12/17/19                                  Type of CPAP:  auto BiPAP min EPAP 7, PSV 4, IPAP 25      mean pressure 8 6,  Max epap 13 7, Max IPAP 17 7                                     Percent usage: 73%                                   Average time used: 4 hr 40 mins                                  Time in large leak: 13 mins                                   Residual AHI: 6 3,  Average ALOK - 4 3                                          Yusra Mayfield DO

## 2019-12-17 NOTE — PROGRESS NOTES
Progress Note - Sleep Medicine  Carry Arie 52 y o  male MRN: [id number]       Impression & Plan:   52 y o  M with PMHx of C3-C6 foraminal stenosis who comes in for management of RED and nocturnal hypoxia  1   Mild RED (AHI - 6 9) - on AutoBiPAP 4-9  He continues to have some central events but they have been reduced  They seem to occur early in the night   (DME - [facility])  ABG with respiratory alkalosis  -  Continue autotitrating BiPAP  - He prefers to avoid a positional device as he feels anxious about something being wrapped around him at bed  He also prefers to avoid an oral appliance as well  -  I also discussed in depth the risk of leaving sleep apnea untreated including hypertension, heart failure, arrhythmia, MI and stroke      2  Nocturnal hypoxia -he continues to note this  He brought in data form his pulse oximeter which demonstrate in first 1/2 hour of the night, regardless of BIPAP or no BiPAP/   It resolves relatively quickly and he does well the remaining portion of the night  Given nocturnal pulse oximetry data, it does seem to appear early in the night and resolve  I do wonder if it is in part artifact vs  Central events early in the night  This could be related to a respiratory alkalosis he has from anxiety which upon initiation of PAP may worsen prior to when he falls asleep and his PCO2 finally rises  PFTs are normal, ABG with respiratory alkalosis but on hypoxia          -  Continue autoBIPAP at current pressure  -   I would try melatonin before bed, this may help with sleep onset  -  If this is not helpful, I would consider temazepam before bet to help slow his respiratory rate  3   Shoulder pain - he has some neuropathic discomfort  Alternatively an attempt with neurontin may also be effective in calming him and preventing this from occurring    4    Respiratory alkalosis - this could be in part related to anxiety vs  Previous scuba diving which may have altered the ventilatory response to oxygen  -  Would consider Restoril or Klonopin before bed as this may slow his breathing and prevent this issue from occurring       ______________________________________________________________________    HPI:    Alvina Guajardo presents today for follow-up for difficulty with his BIPAP  He states that he feels autoBiPAP is a bit better but he is still not feeling well on the machine  He is willing to continue use of BiPAP though he feels frustrated with the machine and is thinking about giving up on it  He understands that his apnea is mild and is wondering if he truly needs it  He did get some objective data with pulse ox machine he has  It shows an abrupt reduction in his oxygen in the first 30 minutes of sleep  It then improves the remaining portion of the night  He states he does not feel any better with the use of the machine  He does not feel refreshed at all  Review of Systems:  Review of Systems   HENT: Negative  Eyes: Negative  Respiratory: Negative  Cardiovascular: Negative  Gastrointestinal: Negative  Endocrine: Negative  Genitourinary: Negative  Musculoskeletal: Negative  Neurological: Negative  Psychiatric/Behavioral: Negative            Social history updates:  Social History     Tobacco Use   Smoking Status Never Smoker   Smokeless Tobacco Never Used     Social History     Socioeconomic History    Marital status: Single     Spouse name: Not on file    Number of children: Not on file    Years of education: Not on file    Highest education level: Not on file   Occupational History    Not on file   Social Needs    Financial resource strain: Not on file    Food insecurity:     Worry: Not on file     Inability: Not on file    Transportation needs:     Medical: Not on file     Non-medical: Not on file   Tobacco Use    Smoking status: Never Smoker    Smokeless tobacco: Never Used   Substance and Sexual Activity    Alcohol use: Yes     Comment: occasional drink with dinner    Drug use: No    Sexual activity: Not on file   Lifestyle    Physical activity:     Days per week: Not on file     Minutes per session: Not on file    Stress: Not on file   Relationships    Social connections:     Talks on phone: Not on file     Gets together: Not on file     Attends Yazidism service: Not on file     Active member of club or organization: Not on file     Attends meetings of clubs or organizations: Not on file     Relationship status: Not on file    Intimate partner violence:     Fear of current or ex partner: Not on file     Emotionally abused: Not on file     Physically abused: Not on file     Forced sexual activity: Not on file   Other Topics Concern    Not on file   Social History Narrative    Not on file       PhysicalExamination:  Vitals:   /70   Pulse 56   Temp (!) 97 °F (36 1 °C)   Resp 16   Ht 5' 9" (1 753 m)   Wt 69 9 kg (154 lb)   SpO2 99%   BMI 22 74 kg/m²   General: Anxious, Awake alert and oriented x 3, conversant without conversational dyspnea, NAD, normal affect  HEENT:  PERRL, Sclera noninjected, nonicteric OU, Nares patent,  no craniofacial abnormalities, Mucous membranes, moist, no oral lesions, normal dentition  NECK: Trachea midline, no accessory muscle use, no stridor, no cervical or supraclavicular adenopathy, JVP not elevated  CARDIAC: Reg, single s1/S2, no m/r/g  PULM: CTA bilaterally no wheezing, rhonchi or rales  ABD: Normoactive bowel sounds, soft nontender, nondistended, no rebound, no rigidity, no guarding  EXT: No cyanosis, no clubbing, no edema, normal capillary refill  NEURO: no focal neurologic deficits, AAOx3, moving all extremities appropriately      Diagnostic Data:  PFT and ABG:  My interpretation  Normal spirometry, lung volumes and DLCO    ABG with respiratory alkalosis, no hypoxia noted      Results:  FEV1/FVC Ratio: 72 %  Forced Vital Capacity: 5 22 L    104 % predicted  FEV1: 3 79 L     95 % predicted     Lung volumes by body plethysmography:   Total Lung Capacity 105 % predicted   Residual volume 104 % predicted     DLCO corrected for patients hemoglobin level: 102 %     AB 50/30/98 saturation 97% on 21% FiO2    Sleep studies:  Home study:  he apnea-hypopnea index was 6 9 events per hour  Lowest measured oxygen saturation was 80%  No other significant abnormalities were identified  These findings are  consistent with a diagnosis of mild obstructive sleep apnea  A repeat tracing for titration of nasal CPAP is indicated      Titration:  Sleep latency was 1 hr 15 min  REM sleep was not seen  Sleep efficiency was very low at 9 3%  No abnormal  breathing events were identified minimum oxygen saturation was 88% no periodic limb movements were seen  A  total of 14 arousals were identified  The remainder of the  evening was spent in wakefulness nasal CPAP was attempted beginning with 5 cm and gradually increased to 6 cm of water  The patient had great difficulty getting comfortable using nasal CPA P  He was particularly bothered by various types of interface devices used during the study   The titration was terminated at 6 cm of water      Compliance Data:   he has been able to use the equipment 73 3% of all days recorded   Average usage was 4 or more hours 66 7% of all days recorded   The estimated AHI is 7 1 abnormal breathing events per hour   In looking at data from the [facility], types of apneas are mixed between clear, hypopneas and obstructive, including almost equal amounts of hypopneas and clear apneas, with less obstructive apnea        Compliance Data:  19 - 9/15/19                                  Type of CPAP: AutoCPAP 6-9                                   Percent usage: 87%                                   Average time used: 4 hr 4 mins                                  Time in large leak: 11 mins 21 sec Residual AHI: 9 5,  Average ALOK - 3 1  OAI - 2 3    Compliance Data:  11/18/19 - 12/17/19                                  Type of CPAP:  autoBiPAP min EPAP 7, PSV 4, IPAP 25      mean pressure 8 6,  Max epap 13 7, Max IPAP 17 7                                     Percent usage: 73%                                   Average time used: 4 hr 40 mins                                  Time in large leak: 13 mins                                   Residual AHI: 6 3,  Average ALOK - 4 3                                          Lacey Olmos, DO

## 2019-12-19 ENCOUNTER — APPOINTMENT (OUTPATIENT)
Dept: LAB | Facility: HOSPITAL | Age: 49
End: 2019-12-19
Payer: COMMERCIAL

## 2019-12-19 ENCOUNTER — TRANSCRIBE ORDERS (OUTPATIENT)
Dept: ADMINISTRATIVE | Facility: HOSPITAL | Age: 49
End: 2019-12-19

## 2019-12-19 DIAGNOSIS — R53.83 OTHER FATIGUE: ICD-10-CM

## 2019-12-19 DIAGNOSIS — R53.83 OTHER FATIGUE: Primary | ICD-10-CM

## 2019-12-19 LAB
25(OH)D3 SERPL-MCNC: 23.4 NG/ML (ref 30–100)
FOLATE SERPL-MCNC: 9.4 NG/ML (ref 3.1–17.5)
VIT B12 SERPL-MCNC: 2688 PG/ML (ref 100–900)

## 2019-12-19 PROCEDURE — 84132 ASSAY OF SERUM POTASSIUM: CPT

## 2019-12-19 PROCEDURE — 82306 VITAMIN D 25 HYDROXY: CPT

## 2019-12-19 PROCEDURE — 82525 ASSAY OF COPPER: CPT

## 2019-12-19 PROCEDURE — 36415 COLL VENOUS BLD VENIPUNCTURE: CPT

## 2019-12-19 PROCEDURE — 82607 VITAMIN B-12: CPT

## 2019-12-19 PROCEDURE — 83785 ASSAY OF MANGANESE: CPT

## 2019-12-19 PROCEDURE — 84252 ASSAY OF VITAMIN B-2: CPT

## 2019-12-19 PROCEDURE — 82495 ASSAY OF CHROMIUM: CPT

## 2019-12-19 PROCEDURE — 83018 HEAVY METAL QUAN EACH NES: CPT

## 2019-12-19 PROCEDURE — 84591 ASSAY OF NOS VITAMIN: CPT

## 2019-12-19 PROCEDURE — 84207 ASSAY OF VITAMIN B-6: CPT

## 2019-12-19 PROCEDURE — 84255 ASSAY OF SELENIUM: CPT

## 2019-12-19 PROCEDURE — 84425 ASSAY OF VITAMIN B-1: CPT

## 2019-12-19 PROCEDURE — 83735 ASSAY OF MAGNESIUM: CPT

## 2019-12-19 PROCEDURE — 82746 ASSAY OF FOLIC ACID SERUM: CPT

## 2019-12-19 PROCEDURE — 82310 ASSAY OF CALCIUM: CPT

## 2019-12-19 PROCEDURE — 84630 ASSAY OF ZINC: CPT

## 2019-12-24 LAB — VIT B2 BLD-MCNC: 164 UG/L (ref 137–370)

## 2019-12-25 LAB — VIT B1 BLD-SCNC: 124.6 NMOL/L (ref 66.5–200)

## 2019-12-27 LAB — VIT B6 SERPL-MCNC: 4 UG/L (ref 5.3–46.7)

## 2019-12-28 LAB
NIACIN SERPL-MCNC: <5 NG/ML (ref 0–5)
NICOTINAMIDE SERPL-MCNC: 6.3 NG/ML (ref 5.2–72.1)

## 2019-12-30 LAB
MISCELLANEOUS LAB TEST RESULT: NORMAL
MISCELLANEOUS LAB TEST RESULT: NORMAL

## 2020-01-03 LAB
MISCELLANEOUS LAB TEST RESULT: NORMAL

## 2020-01-14 LAB — MISCELLANEOUS LAB TEST RESULT: NORMAL

## 2020-01-21 LAB — MISCELLANEOUS LAB TEST RESULT: NORMAL

## 2020-01-22 LAB
MISCELLANEOUS LAB TEST RESULT: NORMAL

## 2020-02-05 LAB — MISCELLANEOUS LAB TEST RESULT: NORMAL

## 2020-02-19 LAB — MISCELLANEOUS LAB TEST RESULT: NORMAL

## 2020-04-16 ENCOUNTER — TELEMEDICINE (OUTPATIENT)
Dept: PULMONOLOGY | Facility: CLINIC | Age: 50
End: 2020-04-16
Payer: COMMERCIAL

## 2020-04-16 VITALS — WEIGHT: 160 LBS | BODY MASS INDEX: 23.7 KG/M2 | HEIGHT: 69 IN

## 2020-04-16 DIAGNOSIS — G47.30 SLEEP APNEA, UNSPECIFIED TYPE: ICD-10-CM

## 2020-04-16 DIAGNOSIS — G47.31 COMPLEX SLEEP APNEA SYNDROME: Primary | ICD-10-CM

## 2020-04-16 PROCEDURE — 99443 PR PHYS/QHP TELEPHONE EVALUATION 21-30 MIN: CPT | Performed by: PHYSICIAN ASSISTANT

## 2020-04-16 RX ORDER — CHLORAL HYDRATE 500 MG
1000 CAPSULE ORAL DAILY
COMMUNITY

## 2020-06-10 ENCOUNTER — APPOINTMENT (OUTPATIENT)
Dept: LAB | Facility: CLINIC | Age: 50
End: 2020-06-10
Payer: COMMERCIAL

## 2020-06-10 ENCOUNTER — TRANSCRIBE ORDERS (OUTPATIENT)
Dept: LAB | Facility: CLINIC | Age: 50
End: 2020-06-10

## 2020-06-10 DIAGNOSIS — I43 DILATED CARDIOMYOPATHY SECONDARY TO SENSITIVITY (HCC): ICD-10-CM

## 2020-06-10 DIAGNOSIS — R53.83 OTHER FATIGUE: Primary | ICD-10-CM

## 2020-06-10 DIAGNOSIS — T78.40XA DILATED CARDIOMYOPATHY SECONDARY TO SENSITIVITY (HCC): ICD-10-CM

## 2020-06-10 DIAGNOSIS — R53.83 OTHER FATIGUE: ICD-10-CM

## 2020-06-10 LAB
25(OH)D3 SERPL-MCNC: 32.9 NG/ML (ref 30–100)
ALT SERPL W P-5'-P-CCNC: 51 U/L (ref 12–78)
AST SERPL W P-5'-P-CCNC: 27 U/L (ref 5–45)
BUN SERPL-MCNC: 14 MG/DL (ref 5–25)
CALCIUM SERPL-MCNC: 9.4 MG/DL (ref 8.3–10.1)
CORTIS SERPL-MCNC: 17.3 UG/DL
FOLATE SERPL-MCNC: 14.1 NG/ML (ref 3.1–17.5)
GLUCOSE P FAST SERPL-MCNC: 98 MG/DL (ref 65–99)
MAGNESIUM SERPL-MCNC: 2.6 MG/DL (ref 1.6–2.6)
POTASSIUM SERPL-SCNC: 4.1 MMOL/L (ref 3.5–5.3)
PSA SERPL-MCNC: 0.7 NG/ML (ref 0–4)
T3 SERPL-MCNC: 0.8 NG/ML (ref 0.6–1.8)
T4 SERPL-MCNC: 9.3 UG/DL (ref 4.7–13.3)
TESTOST SERPL-MCNC: 642 NG/DL (ref 113–1065)
TSH SERPL DL<=0.05 MIU/L-ACNC: 1.92 UIU/ML (ref 0.36–3.74)
VIT B12 SERPL-MCNC: 2327 PG/ML (ref 100–900)

## 2020-06-10 PROCEDURE — 82533 TOTAL CORTISOL: CPT

## 2020-06-10 PROCEDURE — 83735 ASSAY OF MAGNESIUM: CPT

## 2020-06-10 PROCEDURE — 82306 VITAMIN D 25 HYDROXY: CPT

## 2020-06-10 PROCEDURE — 84591 ASSAY OF NOS VITAMIN: CPT

## 2020-06-10 PROCEDURE — 84480 ASSAY TRIIODOTHYRONINE (T3): CPT

## 2020-06-10 PROCEDURE — 84260 ASSAY OF SEROTONIN: CPT

## 2020-06-10 PROCEDURE — 84450 TRANSFERASE (AST) (SGOT): CPT

## 2020-06-10 PROCEDURE — 36415 COLL VENOUS BLD VENIPUNCTURE: CPT

## 2020-06-10 PROCEDURE — 83088 ASSAY OF HISTAMINE: CPT

## 2020-06-10 PROCEDURE — 84425 ASSAY OF VITAMIN B-1: CPT

## 2020-06-10 PROCEDURE — 84443 ASSAY THYROID STIM HORMONE: CPT

## 2020-06-10 PROCEDURE — 84207 ASSAY OF VITAMIN B-6: CPT

## 2020-06-10 PROCEDURE — 84252 ASSAY OF VITAMIN B-2: CPT

## 2020-06-10 PROCEDURE — 82626 DEHYDROEPIANDROSTERONE: CPT

## 2020-06-10 PROCEDURE — G0103 PSA SCREENING: HCPCS

## 2020-06-10 PROCEDURE — 82947 ASSAY GLUCOSE BLOOD QUANT: CPT

## 2020-06-10 PROCEDURE — 84460 ALANINE AMINO (ALT) (SGPT): CPT

## 2020-06-10 PROCEDURE — 82746 ASSAY OF FOLIC ACID SERUM: CPT

## 2020-06-10 PROCEDURE — 84132 ASSAY OF SERUM POTASSIUM: CPT

## 2020-06-10 PROCEDURE — 82310 ASSAY OF CALCIUM: CPT

## 2020-06-10 PROCEDURE — 84436 ASSAY OF TOTAL THYROXINE: CPT

## 2020-06-10 PROCEDURE — 84520 ASSAY OF UREA NITROGEN: CPT

## 2020-06-10 PROCEDURE — 84403 ASSAY OF TOTAL TESTOSTERONE: CPT

## 2020-06-10 PROCEDURE — 82261 ASSAY OF BIOTINIDASE: CPT

## 2020-06-10 PROCEDURE — 82607 VITAMIN B-12: CPT

## 2020-06-10 PROCEDURE — 82128 AMINO ACIDS MULT QUAL: CPT

## 2020-06-10 PROCEDURE — 82384 ASSAY THREE CATECHOLAMINES: CPT

## 2020-06-12 LAB — HISTAMINE BLD-MCNC: 53 NG/ML (ref 12–127)

## 2020-06-13 LAB — VIT B6 SERPL-MCNC: 63.2 UG/L (ref 5.3–46.7)

## 2020-06-14 LAB
VIT B1 BLD-SCNC: 150.8 NMOL/L (ref 66.5–200)
VIT B2 BLD-MCNC: 192 UG/L (ref 137–370)

## 2020-06-15 LAB
DHEA SERPL-MCNC: 282 NG/DL (ref 31–701)
DOPAMINE 24H UR-MRATE: <30 PG/ML (ref 0–48)
EPINEPH PLAS-MCNC: 71 PG/ML (ref 0–62)
NOREPINEPH PLAS-MCNC: 95 PG/ML (ref 0–874)
SEROTONIN PLAS-MCNC: 133 NG/ML (ref 21–321)

## 2020-06-16 LAB
A-AMINOBUTYR SERPL-SCNC: 49.1 UMOL/L (ref 5.4–34.5)
AAA SERPL-SCNC: 0.6 UMOL/L (ref 0–1.9)
ALANINE SERPL-SCNC: 282.5 UMOL/L (ref 209.2–515.5)
ALLOISOLEUCINE SERPL-SCNC: 2.6 UMOL/L (ref 0–3.2)
AMINO ACID PAT SERPL-IMP: ABNORMAL
ARGININE SERPL-SCNC: 67.2 UMOL/L (ref 36.3–119.2)
ARGININOSUCCINATE SERPL-SCNC: 0.1 UMOL/L (ref 0–3)
ASPARAGINE SERPL-SCNC: 61 UMOL/L (ref 29.5–84.5)
ASPARTATE SERPL-SCNC: 2 UMOL/L (ref 0–7.4)
B-AIB SERPL-SCNC: 4.1 UMOL/L (ref 0–4.3)
B-ALANINE SERPL-SCNC: 3 UMOL/L (ref 1.1–9)
CITRULLINE SERPL-SCNC: 43.7 UMOL/L (ref 15.6–46.9)
CYSTATHIONIN SERPL-SCNC: <0.5 UMOL/L (ref 0–0.7)
CYSTINE SERPL-SCNC: 44.4 UMOL/L (ref 15.8–47.3)
GABA SERPL-SCNC: <0.5 UMOL/L (ref 0–0.6)
GLUTAMATE SERPL-SCNC: 41.4 UMOL/L (ref 18.1–155.9)
GLUTAMINE SERPL-SCNC: 729 UMOL/L (ref 372.8–701.4)
GLYCINE SERPL-SCNC: 186.6 UMOL/L (ref 144–411)
HCYS SERPL-SCNC: <0.3 UMOL/L (ref 0–0.2)
HISTIDINE SERPL-SCNC: 80.1 UMOL/L (ref 47.2–98.5)
HOMOCITRULLINE SERPL-SCNC: <0.5 UMOL/L (ref 0–1.7)
ISOLEUCINE SERPL-SCNC: 83.9 UMOL/L (ref 32.8–88.3)
LAB DIRECTOR NAME PROVIDER: ABNORMAL
LEUCINE SERPL-SCNC: 160 UMOL/L (ref 66.7–165.7)
LYSINE SERPL-SCNC: 189.5 UMOL/L (ref 94–278)
METHIONINE SERPL-SCNC: 27.9 UMOL/L (ref 14.7–35.2)
OH-LYSINE SERPL-SCNC: 0.3 UMOL/L (ref 0.1–0.8)
OH-PROLINE SERPL-SCNC: 13.3 UMOL/L (ref 4.7–35.2)
ORNITHINE SERPL-SCNC: 45.6 UMOL/L (ref 30.1–101.3)
PHE SERPL-SCNC: 73.2 UMOL/L (ref 35.8–76.9)
PROLINE SERPL-SCNC: 196.6 UMOL/L (ref 84.8–352.5)
REF LAB TEST METHOD: ABNORMAL
SARCOSINE SERPL-SCNC: 2.6 UMOL/L (ref 0–4)
SERINE SERPL-SCNC: 153.3 UMOL/L (ref 48.7–145.2)
TAURINE SERPL-SCNC: 46 UMOL/L (ref 29.2–132.3)
THREONINE SERPL-SCNC: 74.5 UMOL/L (ref 67.8–211.6)
TRYPTOPHAN SERPL-SCNC: 65.6 UMOL/L (ref 23.5–93)
TYROSINE SERPL-SCNC: 49.2 UMOL/L (ref 27.8–83.3)
VALINE SERPL-SCNC: 283.6 UMOL/L (ref 133–317.1)

## 2020-06-19 LAB
NIACIN SERPL-MCNC: <5 NG/ML (ref 0–5)
NICOTINAMIDE SERPL-MCNC: 9.8 NG/ML (ref 5.2–72.1)

## 2020-07-10 ENCOUNTER — TELEPHONE (OUTPATIENT)
Dept: PULMONOLOGY | Facility: CLINIC | Age: 50
End: 2020-07-10

## 2020-07-10 NOTE — TELEPHONE ENCOUNTER

## 2020-07-13 ENCOUNTER — OFFICE VISIT (OUTPATIENT)
Dept: PULMONOLOGY | Facility: CLINIC | Age: 50
End: 2020-07-13
Payer: COMMERCIAL

## 2020-07-13 VITALS
BODY MASS INDEX: 22.51 KG/M2 | OXYGEN SATURATION: 97 % | RESPIRATION RATE: 14 BRPM | HEIGHT: 69 IN | TEMPERATURE: 97.7 F | DIASTOLIC BLOOD PRESSURE: 70 MMHG | HEART RATE: 63 BPM | SYSTOLIC BLOOD PRESSURE: 110 MMHG | WEIGHT: 152 LBS

## 2020-07-13 DIAGNOSIS — G25.81 RESTLESS LEG SYNDROME: Primary | ICD-10-CM

## 2020-07-13 DIAGNOSIS — G47.34 NOCTURNAL HYPOXIA: ICD-10-CM

## 2020-07-13 DIAGNOSIS — G47.31 COMPLEX SLEEP APNEA SYNDROME: ICD-10-CM

## 2020-07-13 DIAGNOSIS — G47.00 INSOMNIA, UNSPECIFIED TYPE: ICD-10-CM

## 2020-07-13 PROCEDURE — 99214 OFFICE O/P EST MOD 30 MIN: CPT | Performed by: INTERNAL MEDICINE

## 2020-07-13 RX ORDER — GABAPENTIN 100 MG/1
300 CAPSULE ORAL
Qty: 60 CAPSULE | Refills: 0 | Status: SHIPPED | OUTPATIENT
Start: 2020-07-13

## 2020-07-13 NOTE — LETTER
July 14, 2020     Bruna Ma DO  [address]  [address] [facility] [id number]    Patient: Finn Mendoza   YOB: 1970   Date of Visit: 7/13/2020       Dear Dr Shahriar Castillor: Thank you for referring Finn Mendoza to me for evaluation  Below are my notes for this consultation  If you have questions, please do not hesitate to call me  I look forward to following your patient along with you  Sincerely,        Ryan Sandoval DO        CC: No Recipients  Ryan Sandoval DO  7/14/2020  7:18 AM  Sign at close encounter  Progress Note - Sleep Medicine  Finn Mendoza 52 y o  male MRN: [id number]       Impression & Plan:   52 y o  M with PMHx of C3-C6 foraminal stenosis who comes in for follow up for his RED and nocturnal hypoxia  1   Mild RED (AHI - 6 9) - on AutoBiPAP  This seems to have occurred less at current pressure  (DME - [facility])  ABG with respiratory alkalosis      -  Continue autotitrating BiPAP at current pressure         - He prefers to avoid a positional device as he feels anxious about something being wrapped around him at bed  He also prefers to avoid an oral appliance as well        -  I also discussed in depth the risk of leaving sleep apnea untreated including hypertension, heart failure, arrhythmia, MI and stroke      2   Nocturnal hypoxia -           Given nocturnal pulse oximetry data, it does seem to appear early in the night and resolve  I  believe this is more likely artifact given shaking that he is doing at night vs  Central events early in the night         PFTs are normal, ABG with respiratory alkalosis but on hypoxia          -  Continue autoBIPAP at current pressure          -     management or restlessness and sleep onset insomnia as below        3  Restlessness/restless legs syndrome   He may have a disorder of overstimulation          -  Treat RED as above       -  Check iron and magnesium levels       -  Will start a trial of gabapentin 100mg qHS and gradually increase to 300mg qHS    4  Sleep onset insomnia - this could be in part related to anxiety vs  Restlessness/disorder of overstimulation  I do wonder if he has a component related to issues of over stimulation       -  I recommended he trial melatonin at higher dose  If this is not effective we will move to gabapentin       -  May need to consider weighted blankets and relaxation techniques as well     ______________________________________________________________________    HPI:    [place] presents today for follow-up for his RED  He states that he is finally getting used to using his CPAP  He still hates it but does find that he gets some benefit from it  He is still concerned for the hypoxia that seems to develop in the early part of his sleep  He has been wearing his pulse oximeter and does not that it will vibrate when his oxygen drops below 88%  There are nights where this is better and nights where this is worse  He found that when he is more calm it will work better  He is not fighting the machine as much those nights and can relax  He does have a restlessness sensation and as a result will have some difficulty sleeping in the early going  Review of Systems:  Review of Systems   HENT: Negative  Eyes: Negative  Respiratory: Negative  Cardiovascular: Negative  Gastrointestinal: Negative  Endocrine: Negative  Genitourinary: Negative  Musculoskeletal: Negative  Hematological: Negative  Psychiatric/Behavioral: Positive for sleep disturbance           Social history updates:  Social History     Tobacco Use   Smoking Status Never Smoker   Smokeless Tobacco Never Used     Social History     Socioeconomic History    Marital status: Single     Spouse name: Not on file    Number of children: Not on file    Years of education: Not on file    Highest education level: Not on file   Occupational History    Not on file   Social Needs    Financial resource strain: Not on file    Food insecurity:     Worry: Not on file     Inability: Not on file    Transportation needs:     Medical: Not on file     Non-medical: Not on file   Tobacco Use    Smoking status: Never Smoker    Smokeless tobacco: Never Used   Substance and Sexual Activity    Alcohol use: Yes     Frequency: Monthly or less     Comment: occasional drink with dinner    Drug use: No    Sexual activity: Not on file   Lifestyle    Physical activity:     Days per week: Not on file     Minutes per session: Not on file    Stress: Not on file   Relationships    Social connections:     Talks on phone: Not on file     Gets together: Not on file     Attends Religion service: Not on file     Active member of club or organization: Not on file     Attends meetings of clubs or organizations: Not on file     Relationship status: Not on file    Intimate partner violence:     Fear of current or ex partner: Not on file     Emotionally abused: Not on file     Physically abused: Not on file     Forced sexual activity: Not on file   Other Topics Concern    Not on file   Social History Narrative    Not on file       PhysicalExamination:  Vitals:   /70   Pulse 63   Temp 97 7 °F (36 5 °C)   Resp 14   Ht 5' 9" (1 753 m)   Wt 68 9 kg (152 lb)   SpO2 97%   BMI 22 45 kg/m²    General: Pleasant, Awake alert and oriented x 3, conversant without conversational dyspnea, NAD, normal affect  HEENT:  PERRL, Sclera noninjected, nonicteric OU, Nares patent,  no craniofacial abnormalities, Mucous membranes, moist, no oral lesions, normal dentition  NECK: Trachea midline, no accessory muscle use, no stridor, no cervical or supraclavicular adenopathy, JVP not elevated  CARDIAC: Reg, single s1/S2, no m/r/g  PULM: CTA bilaterally no wheezing, rhonchi or rales  ABD: Normoactive bowel sounds, soft nontender, nondistended, no rebound, no rigidity, no guarding  EXT: No cyanosis, no clubbing, no edema, normal capillary refill  NEURO: no focal neurologic deficits, AAOx3, moving all extremities appropriately      Diagnostic Data:  Labs: I personally reviewed the most recent laboratory data pertinent to today's visit  I have personally reviewed pertinent lab results  No results found for: WBC, HGB, HCT, MCV, PLT  Lab Results   Component Value Date    CALCIUM 9 4 06/10/2020    K 4 1 06/10/2020    BUN 14 06/10/2020     No results found for: IGE  Lab Results   Component Value Date    ALT 51 06/10/2020    AST 27 06/10/2020     Lab Results   Component Value Date    IRON 174 2019     Lab Results   Component Value Date    [id number] 2,327 (H) 06/10/2020     Lab Results   Component Value Date    FOLATE 14 1 06/10/2020         PFT and ABG:  My interpretation  Normal spirometry, lung volumes and DLCO     ABG with respiratory alkalosis, no hypoxia noted      Results:  FEV1/FVC Ratio: 72 %  Forced Vital Capacity: 5 22 L    104 % predicted  FEV1: 3 79 L     95 % predicted     Lung volumes by body plethysmography:   Total Lung Capacity 105 % predicted   Residual volume 104 % predicted     DLCO corrected for patients hemoglobin level: 102 %     AB 50/30/98 saturation 97% on 21% FiO2     Sleep studies:  Home study:  he apnea-hypopnea index was 6 9 events per hour  Lowest measured oxygen saturation was 80%  No other significant abnormalities were identified  These findings are  consistent with a diagnosis of mild obstructive sleep apnea  A repeat tracing for titration of nasal CPAP is indicated      Titration:  Sleep latency was 1 hr 15 min  REM sleep was not seen  Sleep efficiency was very low at 9 3%  No abnormal  breathing events were identified minimum oxygen saturation was 88% no periodic limb movements were seen  A  total of 14 arousals were identified      The remainder of the  evening was spent in wakefulness nasal CPAP was attempted beginning with 5 cm and gradually increased to 6 cm of water   The patient had great difficulty getting comfortable using nasal CPA P  He was particularly bothered by various types of interface devices used during the study  The titration was terminated at 6 cm of water      Compliance Data:   he has been able to use the equipment 73 3% of all days recorded   Average usage was 4 or more hours 66 7% of all days recorded   The estimated AHI is 7 1 abnormal breathing events per hour   In looking at data from the [facility], types of apneas are mixed between clear, hypopneas and obstructive, including almost equal amounts of hypopneas and clear apneas, with less obstructive apnea        Compliance Data:  8/17/19 - 9/15/19                                  Type of CPAP: AutoCPAP 6-9                                   Percent usage: 87%                                   Average time used: 4 hr 4 mins                                  Time in large leak: 11 mins 21 sec                                   Residual AHI: 9 5,  Average ALOK - 3 1  OAI - 2 3     Compliance Data:  11/18/19 - 12/17/19                                  Type of CPAP:  autoBiPAP min EPAP 7, PSV 4, IPAP 25  mean pressure 8 6,  Max epap 13 7, Max IPAP 17 7                                      Percent usage: 73%                                   Average time used: 4 hr 40 mins                                  Time in large leak: 13 mins                                   Residual AHI: 6 3,  Average ALOK - 4 3                                      Compliance Data:  6/10/20-  7/9/20                                  Type of CPAP:  autoBiPAP min EPAP 9, PSV 4, IPAP 16      mean pressure 11 7,  Max epap 12, Max IPAP 16                                   Percent usage:  100%                                   Average time used: 5 hr 58 mins                                  Time in large leak: 34 mins                                   Residual AHI:  4 4    Hamzah Dumas, DO

## 2020-07-14 PROBLEM — G47.00 INSOMNIA: Status: ACTIVE | Noted: 2020-07-14

## 2020-07-14 PROBLEM — G47.34 NOCTURNAL HYPOXIA: Status: ACTIVE | Noted: 2020-07-14

## 2020-07-14 PROBLEM — G25.81 RESTLESS LEG SYNDROME: Status: ACTIVE | Noted: 2020-07-14

## 2020-07-14 NOTE — PROGRESS NOTES
Progress Note - Sleep Medicine  Anika Howard 52 y o  male MRN: [id number]       Impression & Plan:   52 y o  M with PMHx of C3-C6 foraminal stenosis who comes in for follow up for his RED and nocturnal hypoxia  1   Mild RED (AHI - 6 9) - on AutoBiPAP  This seems to have occurred less at current pressure  (DME - [facility])  ABG with respiratory alkalosis      -  Continue autotitrating BiPAP at current pressure         - He prefers to avoid a positional device as he feels anxious about something being wrapped around him at bed  He also prefers to avoid an oral appliance as well        -  I also discussed in depth the risk of leaving sleep apnea untreated including hypertension, heart failure, arrhythmia, MI and stroke      2   Nocturnal hypoxia -           Given nocturnal pulse oximetry data, it does seem to appear early in the night and resolve  I believe this is more likely artifact given shaking that he is doing at night vs  Central events early in the night         PFTs are normal, ABG with respiratory alkalosis but on hypoxia          -  Continue autoBIPAP at current pressure          -   management or restlessness and sleep onset insomnia as below        3  Restlessness/restless legs syndrome  He may have a disorder of overstimulation          -  Treat RED as above       -  Check iron and magnesium levels       -  Will start a trial of gabapentin 100mg qHS and gradually increase to 300mg qHS    4  Sleep onset insomnia - this could be in part related to anxiety vs  Restlessness/disorder of overstimulation  I do wonder if he has a component related to issues of over stimulation       -  I recommended he trial melatonin at higher dose    If this is not effective we will move to gabapentin       -  May need to consider weighted blankets and relaxation techniques as well     ______________________________________________________________________    HPI:    Anika Howard presents today for follow-up for his RED  He states that he is finally getting used to using his CPAP  He still hates it but does find that he gets some benefit from it  He is still concerned for the hypoxia that seems to develop in the early part of his sleep  He has been wearing his pulse oximeter and does not that it will vibrate when his oxygen drops below 88%  There are nights where this is better and nights where this is worse  He found that when he is more calm it will work better  He is not fighting the machine as much those nights and can relax  He does have a restlessness sensation and as a result will have some difficulty sleeping in the early going  Review of Systems:  Review of Systems   HENT: Negative  Eyes: Negative  Respiratory: Negative  Cardiovascular: Negative  Gastrointestinal: Negative  Endocrine: Negative  Genitourinary: Negative  Musculoskeletal: Negative  Hematological: Negative  Psychiatric/Behavioral: Positive for sleep disturbance           Social history updates:  Social History     Tobacco Use   Smoking Status Never Smoker   Smokeless Tobacco Never Used     Social History     Socioeconomic History    Marital status: Single     Spouse name: Not on file    Number of children: Not on file    Years of education: Not on file    Highest education level: Not on file   Occupational History    Not on file   Social Needs    Financial resource strain: Not on file    Food insecurity:     Worry: Not on file     Inability: Not on file    Transportation needs:     Medical: Not on file     Non-medical: Not on file   Tobacco Use    Smoking status: Never Smoker    Smokeless tobacco: Never Used   Substance and Sexual Activity    Alcohol use: Yes     Frequency: Monthly or less     Comment: occasional drink with dinner    Drug use: No    Sexual activity: Not on file   Lifestyle    Physical activity:     Days per week: Not on file     Minutes per session: Not on file    Stress: Not on file   Relationships    Social connections:     Talks on phone: Not on file     Gets together: Not on file     Attends Voodoo service: Not on file     Active member of club or organization: Not on file     Attends meetings of clubs or organizations: Not on file     Relationship status: Not on file    Intimate partner violence:     Fear of current or ex partner: Not on file     Emotionally abused: Not on file     Physically abused: Not on file     Forced sexual activity: Not on file   Other Topics Concern    Not on file   Social History Narrative    Not on file       PhysicalExamination:  Vitals:   /70   Pulse 63   Temp 97 7 °F (36 5 °C)   Resp 14   Ht 5' 9" (1 753 m)   Wt 68 9 kg (152 lb)   SpO2 97%   BMI 22 45 kg/m²   General: Pleasant, Awake alert and oriented x 3, conversant without conversational dyspnea, NAD, normal affect  HEENT:  PERRL, Sclera noninjected, nonicteric OU, Nares patent,  no craniofacial abnormalities, Mucous membranes, moist, no oral lesions, normal dentition  NECK: Trachea midline, no accessory muscle use, no stridor, no cervical or supraclavicular adenopathy, JVP not elevated  CARDIAC: Reg, single s1/S2, no m/r/g  PULM: CTA bilaterally no wheezing, rhonchi or rales  ABD: Normoactive bowel sounds, soft nontender, nondistended, no rebound, no rigidity, no guarding  EXT: No cyanosis, no clubbing, no edema, normal capillary refill  NEURO: no focal neurologic deficits, AAOx3, moving all extremities appropriately      Diagnostic Data:  Labs: I personally reviewed the most recent laboratory data pertinent to today's visit  I have personally reviewed pertinent lab results    No results found for: WBC, HGB, HCT, MCV, PLT  Lab Results   Component Value Date    CALCIUM 9 4 06/10/2020    K 4 1 06/10/2020    BUN 14 06/10/2020     No results found for: IGE  Lab Results   Component Value Date    ALT 51 06/10/2020    AST 27 06/10/2020     Lab Results   Component Value Date    IRON 174 2019     Lab Results   Component Value Date    [id number] 2,327 (H) 06/10/2020     Lab Results   Component Value Date    FOLATE 14 1 06/10/2020         PFT and ABG:  My interpretation  Normal spirometry, lung volumes and DLCO     ABG with respiratory alkalosis, no hypoxia noted      Results:  FEV1/FVC Ratio: 72 %  Forced Vital Capacity: 5 22 L    104 % predicted  FEV1: 3 79 L     95 % predicted     Lung volumes by body plethysmography:   Total Lung Capacity 105 % predicted   Residual volume 104 % predicted     DLCO corrected for patients hemoglobin level: 102 %     AB 50/30/98 saturation 97% on 21% FiO2     Sleep studies:  Home study:  he apnea-hypopnea index was 6 9 events per hour  Lowest measured oxygen saturation was 80%  No other significant abnormalities were identified  These findings are  consistent with a diagnosis of mild obstructive sleep apnea  A repeat tracing for titration of nasal CPAP is indicated      Titration:  Sleep latency was 1 hr 15 min  REM sleep was not seen  Sleep efficiency was very low at 9 3%  No abnormal  breathing events were identified minimum oxygen saturation was 88% no periodic limb movements were seen  A  total of 14 arousals were identified      The remainder of the  evening was spent in wakefulness nasal CPAP was attempted beginning with 5 cm and gradually increased to 6 cm of water  The patient had great difficulty getting comfortable using nasal CPA P  He was particularly bothered by various types of interface devices used during the study   The titration was terminated at 6 cm of water      Compliance Data:   he has been able to use the equipment 73 3% of all days recorded   Average usage was 4 or more hours 66 7% of all days recorded   The estimated AHI is 7 1 abnormal breathing events per hour   In looking at data from the [facility], types of apneas are mixed between clear, hypopneas and obstructive, including almost equal amounts of hypopneas and clear apneas, with less obstructive apnea        Compliance Data:  8/17/19 - 9/15/19                                  Type of CPAP: AutoCPAP 6-9                                   Percent usage: 87%                                   Average time used: 4 hr 4 mins                                  Time in large leak: 11 mins 21 sec                                   Residual AHI: 9 5,  Average ALOK - 3 1  OAI - 2 3     Compliance Data:  11/18/19 - 12/17/19                                  Type of CPAP:  autoBiPAP min EPAP 7, PSV 4, IPAP 25  mean pressure 8 6,  Max epap 13 7, Max IPAP 17 7                                      Percent usage: 73%                                   Average time used: 4 hr 40 mins                                  Time in large leak: 13 mins                                   Residual AHI: 6 3,  Average ALOK - 4 3                                      Compliance Data: 6/10/20- 7/9/20                                  Type of CPAP:  autoBiPAP min EPAP 9, PSV 4, IPAP 16      mean pressure 11 7,  Max epap 12, Max IPAP 16                                   Percent usage: 100%                                   Average time used: 5 hr 58 mins                                  Time in large leak: 34 mins                                   Residual AHI: 4 4    Modesta Fountain DO

## 2020-07-16 ENCOUNTER — TRANSCRIBE ORDERS (OUTPATIENT)
Dept: LAB | Facility: CLINIC | Age: 50
End: 2020-07-16

## 2020-07-16 ENCOUNTER — APPOINTMENT (OUTPATIENT)
Dept: LAB | Facility: CLINIC | Age: 50
End: 2020-07-16
Payer: COMMERCIAL

## 2020-07-16 DIAGNOSIS — G25.81 RESTLESS LEG SYNDROME: ICD-10-CM

## 2020-07-16 LAB
FERRITIN SERPL-MCNC: 105 NG/ML (ref 8–388)
IRON SATN MFR SERPL: 57 %
IRON SERPL-MCNC: 161 UG/DL (ref 65–175)
TIBC SERPL-MCNC: 283 UG/DL (ref 250–450)

## 2020-07-16 PROCEDURE — 83540 ASSAY OF IRON: CPT

## 2020-07-16 PROCEDURE — 82728 ASSAY OF FERRITIN: CPT

## 2020-07-16 PROCEDURE — 83550 IRON BINDING TEST: CPT

## 2020-07-16 PROCEDURE — 36415 COLL VENOUS BLD VENIPUNCTURE: CPT

## 2020-07-21 LAB — MISCELLANEOUS LAB TEST RESULT: NORMAL

## 2021-06-11 ENCOUNTER — APPOINTMENT (OUTPATIENT)
Dept: LAB | Facility: CLINIC | Age: 51
End: 2021-06-11
Payer: COMMERCIAL

## 2021-06-11 ENCOUNTER — TRANSCRIBE ORDERS (OUTPATIENT)
Dept: LAB | Facility: CLINIC | Age: 51
End: 2021-06-11

## 2021-06-11 DIAGNOSIS — R53.83 FATIGUE, UNSPECIFIED TYPE: ICD-10-CM

## 2021-06-11 DIAGNOSIS — R53.83 FATIGUE, UNSPECIFIED TYPE: Primary | ICD-10-CM

## 2021-06-11 LAB
ALT SERPL W P-5'-P-CCNC: 41 U/L (ref 12–78)
AST SERPL W P-5'-P-CCNC: 23 U/L (ref 5–45)
CREAT UR-MCNC: 40.8 MG/DL
TSH SERPL DL<=0.05 MIU/L-ACNC: 1.31 UIU/ML (ref 0.36–3.74)

## 2021-06-11 PROCEDURE — 84460 ALANINE AMINO (ALT) (SGPT): CPT

## 2021-06-11 PROCEDURE — 84443 ASSAY THYROID STIM HORMONE: CPT

## 2021-06-11 PROCEDURE — 84450 TRANSFERASE (AST) (SGOT): CPT

## 2021-06-11 PROCEDURE — 82570 ASSAY OF URINE CREATININE: CPT

## 2021-06-11 PROCEDURE — 82523 COLLAGEN CROSSLINKS: CPT

## 2021-06-11 PROCEDURE — 36415 COLL VENOUS BLD VENIPUNCTURE: CPT

## 2021-06-15 LAB — MISCELLANEOUS LAB TEST RESULT: NORMAL

## 2021-08-20 ENCOUNTER — APPOINTMENT (OUTPATIENT)
Dept: LAB | Facility: CLINIC | Age: 51
End: 2021-08-20
Payer: COMMERCIAL

## 2021-08-20 DIAGNOSIS — R53.83 OTHER FATIGUE: ICD-10-CM

## 2021-08-20 LAB
25(OH)D3 SERPL-MCNC: 25 NG/ML (ref 30–100)
CALCIUM SERPL-MCNC: 9 MG/DL (ref 8.3–10.1)
ESTRADIOL SERPL-MCNC: 24 PG/ML (ref 11–52.5)
MAGNESIUM SERPL-MCNC: 2.4 MG/DL (ref 1.6–2.6)
POTASSIUM SERPL-SCNC: 4.4 MMOL/L (ref 3.5–5.3)
TESTOST SERPL-MCNC: 614 NG/DL (ref 113–1065)

## 2021-08-20 PROCEDURE — 82626 DEHYDROEPIANDROSTERONE: CPT

## 2021-08-20 PROCEDURE — 82306 VITAMIN D 25 HYDROXY: CPT

## 2021-08-20 PROCEDURE — 83735 ASSAY OF MAGNESIUM: CPT

## 2021-08-20 PROCEDURE — 82670 ASSAY OF TOTAL ESTRADIOL: CPT

## 2021-08-20 PROCEDURE — 82310 ASSAY OF CALCIUM: CPT

## 2021-08-20 PROCEDURE — 84132 ASSAY OF SERUM POTASSIUM: CPT

## 2021-08-20 PROCEDURE — 36415 COLL VENOUS BLD VENIPUNCTURE: CPT

## 2021-08-20 PROCEDURE — 84403 ASSAY OF TOTAL TESTOSTERONE: CPT

## 2021-08-24 LAB — DHEA SERPL-MCNC: 130 NG/DL (ref 31–701)

## 2022-01-06 ENCOUNTER — APPOINTMENT (OUTPATIENT)
Dept: LAB | Facility: CLINIC | Age: 52
End: 2022-01-06
Payer: COMMERCIAL

## 2022-01-06 DIAGNOSIS — R53.83 FATIGUE, UNSPECIFIED TYPE: ICD-10-CM

## 2022-01-06 LAB — 25(OH)D3 SERPL-MCNC: 35.7 NG/ML (ref 30–100)

## 2022-01-06 PROCEDURE — 36415 COLL VENOUS BLD VENIPUNCTURE: CPT

## 2022-01-06 PROCEDURE — 82306 VITAMIN D 25 HYDROXY: CPT

## 2024-06-05 ENCOUNTER — HOSPITAL ENCOUNTER (OUTPATIENT)
Dept: HOSPITAL 99 - DHSLP | Age: 54
End: 2024-06-05
Payer: COMMERCIAL

## 2024-06-05 DIAGNOSIS — G47.33: Primary | ICD-10-CM

## 2024-06-05 DIAGNOSIS — R06.83: ICD-10-CM

## 2024-07-17 ENCOUNTER — HOSPITAL ENCOUNTER (OUTPATIENT)
Dept: HOSPITAL 99 - HWRAD | Age: 54
End: 2024-07-17
Payer: COMMERCIAL

## 2024-07-17 DIAGNOSIS — M79.675: Primary | ICD-10-CM

## 2024-07-31 ENCOUNTER — HOSPITAL ENCOUNTER (OUTPATIENT)
Dept: HOSPITAL 99 - DHSLP | Age: 54
End: 2024-07-31
Payer: COMMERCIAL

## 2024-07-31 DIAGNOSIS — G47.00: Primary | ICD-10-CM

## 2024-07-31 DIAGNOSIS — R06.83: ICD-10-CM
